# Patient Record
Sex: MALE | Race: WHITE | NOT HISPANIC OR LATINO | Employment: FULL TIME | URBAN - METROPOLITAN AREA
[De-identification: names, ages, dates, MRNs, and addresses within clinical notes are randomized per-mention and may not be internally consistent; named-entity substitution may affect disease eponyms.]

---

## 2024-07-09 ENCOUNTER — APPOINTMENT (OUTPATIENT)
Dept: PRIMARY CARE | Facility: CLINIC | Age: 59
End: 2024-07-09
Payer: MEDICARE

## 2024-07-17 ENCOUNTER — LAB (OUTPATIENT)
Dept: LAB | Facility: LAB | Age: 59
End: 2024-07-17
Payer: MEDICARE

## 2024-07-17 ENCOUNTER — APPOINTMENT (OUTPATIENT)
Dept: PRIMARY CARE | Facility: CLINIC | Age: 59
End: 2024-07-17
Payer: MEDICARE

## 2024-07-17 VITALS
WEIGHT: 259 LBS | RESPIRATION RATE: 16 BRPM | BODY MASS INDEX: 34.33 KG/M2 | HEART RATE: 80 BPM | HEIGHT: 73 IN | TEMPERATURE: 97 F | DIASTOLIC BLOOD PRESSURE: 68 MMHG | OXYGEN SATURATION: 96 % | SYSTOLIC BLOOD PRESSURE: 130 MMHG

## 2024-07-17 DIAGNOSIS — Z12.5 PROSTATE CANCER SCREENING: ICD-10-CM

## 2024-07-17 DIAGNOSIS — I10 HYPERTENSION, UNSPECIFIED TYPE: ICD-10-CM

## 2024-07-17 DIAGNOSIS — R06.83 SNORING: ICD-10-CM

## 2024-07-17 DIAGNOSIS — Z13.220 LIPID SCREENING: ICD-10-CM

## 2024-07-17 DIAGNOSIS — R73.9 HYPERGLYCEMIA: ICD-10-CM

## 2024-07-17 DIAGNOSIS — R53.82 CHRONIC FATIGUE: Primary | ICD-10-CM

## 2024-07-17 DIAGNOSIS — E66.09 CLASS 1 OBESITY DUE TO EXCESS CALORIES WITHOUT SERIOUS COMORBIDITY WITH BODY MASS INDEX (BMI) OF 34.0 TO 34.9 IN ADULT: ICD-10-CM

## 2024-07-17 DIAGNOSIS — Z76.89 ENCOUNTER TO ESTABLISH CARE WITH NEW DOCTOR: ICD-10-CM

## 2024-07-17 DIAGNOSIS — Z12.11 COLON CANCER SCREENING: ICD-10-CM

## 2024-07-17 PROBLEM — E66.811 CLASS 1 OBESITY DUE TO EXCESS CALORIES WITHOUT SERIOUS COMORBIDITY WITH BODY MASS INDEX (BMI) OF 34.0 TO 34.9 IN ADULT: Status: ACTIVE | Noted: 2024-07-17

## 2024-07-17 LAB
ALBUMIN SERPL BCP-MCNC: 4.8 G/DL (ref 3.4–5)
ALP SERPL-CCNC: 66 U/L (ref 33–120)
ALT SERPL W P-5'-P-CCNC: 19 U/L (ref 10–52)
ANION GAP SERPL CALC-SCNC: 15 MMOL/L (ref 10–20)
AST SERPL W P-5'-P-CCNC: 21 U/L (ref 9–39)
BASOPHILS # BLD AUTO: 0.06 X10*3/UL (ref 0–0.1)
BASOPHILS NFR BLD AUTO: 0.9 %
BILIRUB SERPL-MCNC: 0.9 MG/DL (ref 0–1.2)
BUN SERPL-MCNC: 25 MG/DL (ref 6–23)
CALCIUM SERPL-MCNC: 9.8 MG/DL (ref 8.6–10.3)
CHLORIDE SERPL-SCNC: 103 MMOL/L (ref 98–107)
CHOLEST SERPL-MCNC: 235 MG/DL (ref 0–199)
CHOLESTEROL/HDL RATIO: 5.6
CO2 SERPL-SCNC: 28 MMOL/L (ref 21–32)
CREAT SERPL-MCNC: 1.18 MG/DL (ref 0.5–1.3)
EGFRCR SERPLBLD CKD-EPI 2021: 71 ML/MIN/1.73M*2
EOSINOPHIL # BLD AUTO: 0.13 X10*3/UL (ref 0–0.7)
EOSINOPHIL NFR BLD AUTO: 1.9 %
ERYTHROCYTE [DISTWIDTH] IN BLOOD BY AUTOMATED COUNT: 12.8 % (ref 11.5–14.5)
GLUCOSE SERPL-MCNC: 82 MG/DL (ref 74–99)
HCT VFR BLD AUTO: 51.3 % (ref 41–52)
HDLC SERPL-MCNC: 42.2 MG/DL
HGB BLD-MCNC: 17.2 G/DL (ref 13.5–17.5)
IMM GRANULOCYTES # BLD AUTO: 0.01 X10*3/UL (ref 0–0.7)
IMM GRANULOCYTES NFR BLD AUTO: 0.1 % (ref 0–0.9)
LDLC SERPL CALC-MCNC: 141 MG/DL
LYMPHOCYTES # BLD AUTO: 2.36 X10*3/UL (ref 1.2–4.8)
LYMPHOCYTES NFR BLD AUTO: 33.7 %
MCH RBC QN AUTO: 29 PG (ref 26–34)
MCHC RBC AUTO-ENTMCNC: 33.5 G/DL (ref 32–36)
MCV RBC AUTO: 86 FL (ref 80–100)
MONOCYTES # BLD AUTO: 0.64 X10*3/UL (ref 0.1–1)
MONOCYTES NFR BLD AUTO: 9.1 %
NEUTROPHILS # BLD AUTO: 3.81 X10*3/UL (ref 1.2–7.7)
NEUTROPHILS NFR BLD AUTO: 54.3 %
NON HDL CHOLESTEROL: 193 MG/DL (ref 0–149)
NRBC BLD-RTO: 0 /100 WBCS (ref 0–0)
PLATELET # BLD AUTO: 246 X10*3/UL (ref 150–450)
POTASSIUM SERPL-SCNC: 4.7 MMOL/L (ref 3.5–5.3)
PROT SERPL-MCNC: 7.5 G/DL (ref 6.4–8.2)
PSA SERPL-MCNC: 8.45 NG/ML
RBC # BLD AUTO: 5.94 X10*6/UL (ref 4.5–5.9)
SODIUM SERPL-SCNC: 141 MMOL/L (ref 136–145)
TRIGL SERPL-MCNC: 257 MG/DL (ref 0–149)
VLDL: 51 MG/DL (ref 0–40)
WBC # BLD AUTO: 7 X10*3/UL (ref 4.4–11.3)

## 2024-07-17 PROCEDURE — 80053 COMPREHEN METABOLIC PANEL: CPT

## 2024-07-17 PROCEDURE — 36415 COLL VENOUS BLD VENIPUNCTURE: CPT

## 2024-07-17 PROCEDURE — 85025 COMPLETE CBC W/AUTO DIFF WBC: CPT

## 2024-07-17 PROCEDURE — 3008F BODY MASS INDEX DOCD: CPT | Performed by: FAMILY MEDICINE

## 2024-07-17 PROCEDURE — 99203 OFFICE O/P NEW LOW 30 MIN: CPT | Performed by: FAMILY MEDICINE

## 2024-07-17 PROCEDURE — 3075F SYST BP GE 130 - 139MM HG: CPT | Performed by: FAMILY MEDICINE

## 2024-07-17 PROCEDURE — 80061 LIPID PANEL: CPT

## 2024-07-17 PROCEDURE — 84153 ASSAY OF PSA TOTAL: CPT

## 2024-07-17 PROCEDURE — 3078F DIAST BP <80 MM HG: CPT | Performed by: FAMILY MEDICINE

## 2024-07-17 PROCEDURE — 83036 HEMOGLOBIN GLYCOSYLATED A1C: CPT

## 2024-07-17 RX ORDER — LOSARTAN POTASSIUM 100 MG/1
100 TABLET ORAL DAILY
COMMUNITY

## 2024-07-17 ASSESSMENT — PATIENT HEALTH QUESTIONNAIRE - PHQ9
2. FEELING DOWN, DEPRESSED OR HOPELESS: NOT AT ALL
SUM OF ALL RESPONSES TO PHQ9 QUESTIONS 1 AND 2: 0
1. LITTLE INTEREST OR PLEASURE IN DOING THINGS: NOT AT ALL

## 2024-07-17 NOTE — LETTER
August 23, 2024     Wyattroberto carlos Zepedason  [address]  [facility] [address]      Dear Mr. Malave:    Below are the results from your recent visit:    Resulted Orders   Cologuard® colon cancer screening   Result Value Ref Range    NONINV COLON CA DNA+OCC BLD SCRN STL QL Negative Negative      Comment:        NEGATIVE TEST RESULT. A negative Cologuard result indicates a low likelihood that a colorectal cancer (CRC) or advanced adenoma (adenomatous polyps with more advanced pre-malignant features)  is present. The chance that a person with a negative Cologuard test has a colorectal cancer is less than 1 in 1500 (negative predictive value >99.9%) or has an  advanced adenoma is less than  5.3% (negative predictive value 94.7%). These data are based on a prospective cross-sectional study of 10,000 individuals at average risk for colorectal cancer who were screened with both Cologuard and colonoscopy. (Bob MURDOCK et al, N Engl J Med 2014;370(14):9019-9360) The normal value (reference range) for this assay is negative.    COLOGUARD RE-SCREENING RECOMMENDATION: Periodic colorectal cancer screening is an important part of preventive healthcare for asymptomatic individuals at average risk for colorectal cancer.  Following a negative Cologuard result, the American Cancer Society and U.S.  Multi-Society Task Force screening guidelines recommend a Cologuard re-screening interval of 3 years.   References: American Cancer Society Guideline for Colorectal Cancer Screening: https://www.cancer.org/cancer/colon-rectal-cancer/gtayebgzd-uxnwazelo-lxabnus/acs-recommendations.html.; Bladimir ENNIS, Marlen BERRY, Qasim STOKESK, Colorectal Cancer Screening: Recommendations for Physicians and Patients from the U.S. Multi-Society Task Force on Colorectal Cancer Screening , Am J Gastroenterology 2017; 112:2524-3778.    TEST DESCRIPTION: Composite algorithmic analysis of stool DNA-biomarkers with hemoglobin immunoassay.   Quantitative values of  individual biomarkers are not reportable and are not associated with individual biomarker result reference ranges. Cologuard is intended for colorectal cancer screening of adults of either sex, 45 years or older, who are at average-risk for colorectal cancer (CRC). Cologuard has been approved for use by the U.S. FDA. The performance of Cologuard was  established in a cross sectional study of average-risk adults aged 50-84. Cologuard performance in patients ages 45 to 49 years was estimated by sub-group analysis of near-age groups. Colonoscopies performed for a positive result may find as the most clinically significant lesion: colorectal cancer [4.0%], advanced adenoma (including sessile serrated polyps greater than or equal to 1cm diameter) [20%] or non- advanced adenoma [31%]; or no colorectal neoplasia [45%]. These estimates are derived from a prospective cross-sectional screening study of 10,000 individuals at average risk for colorectal cancer who were screened with both Cologuard and colonoscopy. (Bob STEWART. et al, N Engl J Med 2014;370(14):5275-3055.) Cologuard may produce a false negative or false positive result (no colorectal cancer or precancerous polyp present at colonoscopy follow up). A negative Cologuard test result does not guarantee the absence of CRC or advanced adenoma (pre-cancer). The current Cologuard  screening interval is every 3 years. (American Cancer Society and U.S. Multi-Society Task Force). Cologuard performance data in a 10,000 patient pivotal study using colonoscopy as the reference method can be accessed at the following location: www.[employer].Spotistic/results. Additional description of the Cologuard test process, warnings and precautions can be found at www.cologuard.com.       Cologuard negative.  Repeat in 3 years.    If you have any questions or concerns, please don't hesitate to call.         Sincerely,        Mateo Tong MD

## 2024-07-17 NOTE — PROGRESS NOTES
Subjective   Patient ID: Wyatt Malave is a 58 y.o. male who presents for Establish Care, Hypertension, and sleeping issues .  HPI  Patient presents today as a new patient  His last PCP was Dr Tong  Last seen this PCP 8 year  Choose to come here because was pt here long time ago    Being seen today for  Est care, HTN    Denies chest pain, swelling, headaches, lightheadedness or dizziness.   Some SOB  Tries Eats a generally healthy diet, staying active .   Does check BP at home.   Currently taking losartan     Pt is having issues with sleep on going for long time  Pt states that he stop breathing while he is sleeping    Pt is getting up 3 to 4 x a night to urine       No other questions and or concerns   Review of Systems  Constitutional:  no chills, no fever and no night sweats.  Eyes: no blurred vision and no eyesight problems.  ENT: no hearing loss, no nasal congestion, no hoarseness and no sore throat.  Neck: no mass (es) and no swelling.  Cardiovascular: no chest pain, no intermittent leg claudication, no lower extremity edema, no palpitation and no syncope.  Respiratory: no cough, no shortness of breath during exertion, no shortness of breath at rest and no wheezing.  Gastrointestinal: no abdominal pain, no blood in stools, no constipation, no diarrhea, no melena, no nausea, no rectal pain and no vomiting.  Genitourinary: no dysuria, no change in urinary frequency, no urinary hesitancy and no feelings of urinary urgency.  Musculoskeletal: no arthralgias, no back pain and no myalgias.  Integumentary: no new skin lesions and no rashes.  Neurological: no difficulty walking, no headache, no limb weakness, no numbness and no tingling.  Psychiatric/Behavioral: no anxiety, no depression, no anhedonia and no substance use disorders.  Endocrine: no recent weight gain and no recent weight loss.  Hematologic/Lymphatic: no tendency for easy bruising and no swollen glands    Objective   Physical Exam  Patient in with  "complaints of sleep issues snoring supposedly was tested and had sleep apnea test was done a number of years ago but he never did the titration or got the machinery.  His wife is also concerned that he may have diabetes has not had a physical exam or blood work no blood in a while.  He has known complaint of chest pain or shortness of breath at this point.  Well-developed well-nourished obese male in no acute distress physical exam today's office visit constitutional alert and oriented x3.    Head is atraumatic HEENT is within normal limits.    Neck supple no masses full range of motion.    Thyroid is normal in size no thyromegaly there is no carotid bruits.    Pulmonary exam shows clear to auscultation no respiratory distress.    Cardiovascular shows no murmur rub or gallop.  Regular rate and rhythm.    Abdominal exam soft nontender no hepatosplenomegaly or masses normal bowel sounds no rebound no guarding.    Musculoskeletal exam no joint pain no muscle pain full range of motion.    Psych exam normal mood and affect.    Dermatologic exam no skin lesions no rash no blemishes.    Neuro exam is no focal deficits.  Normal exam.    Extremities no edema normal pulses normal capillary refill.    /68 (BP Location: Left arm, Patient Position: Sitting, BP Cuff Size: Large adult)   Pulse 80   Temp 36.1 °C (97 °F) (Temporal)   Resp 16   Ht 1.854 m (6' 1\")   Wt 117 kg (259 lb)   SpO2 96%   BMI 34.17 kg/m²     Lab Results   Component Value Date    WBC 7.0 07/17/2024    HGB 17.2 07/17/2024    HCT 51.3 07/17/2024    MCV 86 07/17/2024     07/17/2024       Assessment/Plan plan is to get blood drawn also refer him for home sleep study follow-up when we have all the results.  Problem List Items Addressed This Visit          Cardiac and Vasculature    HTN (hypertension)    Relevant Orders    Comprehensive Metabolic Panel (Completed)    CBC and Auto Differential (Completed)       Endocrine/Metabolic    BMI " 34.0-34.9,adult    Class 1 obesity due to excess calories without serious comorbidity with body mass index (BMI) of 34.0 to 34.9 in adult       Health Encounters    Encounter to establish care with new doctor     Other Visit Diagnoses       Chronic fatigue    -  Primary    Relevant Orders    Home sleep apnea test (HSAT)    Lipid screening        Relevant Orders    Lipid Panel (Completed)    Prostate cancer screening        Relevant Orders    Prostate Specific Antigen, Screen (Completed)    Colon cancer screening        Relevant Orders    Cologuard® colon cancer screening    Snoring        Relevant Orders    Home sleep apnea test (HSAT)    Hyperglycemia        Relevant Orders    Hemoglobin A1C (Completed)

## 2024-07-18 LAB
EST. AVERAGE GLUCOSE BLD GHB EST-MCNC: 120 MG/DL
HBA1C MFR BLD: 5.8 %

## 2024-08-23 ENCOUNTER — TELEPHONE (OUTPATIENT)
Dept: PRIMARY CARE | Facility: CLINIC | Age: 59
End: 2024-08-23
Payer: MEDICARE

## 2024-08-23 LAB — NONINV COLON CA DNA+OCC BLD SCRN STL QL: NEGATIVE

## 2024-08-23 NOTE — TELEPHONE ENCOUNTER
----- Message from Mateo Tong sent at 8/23/2024  8:29 AM EDT -----  Cologuard negative.  Repeat in 3 years

## 2025-03-22 ENCOUNTER — HOSPITAL ENCOUNTER (INPATIENT)
Facility: HOSPITAL | Age: 60
DRG: 309 | End: 2025-03-22
Attending: STUDENT IN AN ORGANIZED HEALTH CARE EDUCATION/TRAINING PROGRAM | Admitting: STUDENT IN AN ORGANIZED HEALTH CARE EDUCATION/TRAINING PROGRAM
Payer: MEDICARE

## 2025-03-22 ENCOUNTER — APPOINTMENT (OUTPATIENT)
Dept: CARDIOLOGY | Facility: HOSPITAL | Age: 60
DRG: 309 | End: 2025-03-22
Payer: MEDICARE

## 2025-03-22 ENCOUNTER — APPOINTMENT (OUTPATIENT)
Dept: RADIOLOGY | Facility: HOSPITAL | Age: 60
DRG: 309 | End: 2025-03-22
Payer: MEDICARE

## 2025-03-22 DIAGNOSIS — I48.91 ATRIAL FIBRILLATION WITH RVR (MULTI): Primary | ICD-10-CM

## 2025-03-22 DIAGNOSIS — R07.9 CHEST PAIN, UNSPECIFIED TYPE: ICD-10-CM

## 2025-03-22 DIAGNOSIS — M79.89: ICD-10-CM

## 2025-03-22 DIAGNOSIS — I25.10 CORONARY ARTERY CALCIFICATION SEEN ON CAT SCAN: ICD-10-CM

## 2025-03-22 DIAGNOSIS — N18.9: ICD-10-CM

## 2025-03-22 DIAGNOSIS — M25.512 LEFT SHOULDER PAIN, UNSPECIFIED CHRONICITY: ICD-10-CM

## 2025-03-22 DIAGNOSIS — I48.19 OTHER PERSISTENT ATRIAL FIBRILLATION: ICD-10-CM

## 2025-03-22 LAB
ALBUMIN SERPL BCP-MCNC: 4.4 G/DL (ref 3.4–5)
ALP SERPL-CCNC: 43 U/L (ref 33–136)
ALT SERPL W P-5'-P-CCNC: 23 U/L (ref 10–52)
ANION GAP SERPL CALC-SCNC: 18 MMOL/L (ref 10–20)
AST SERPL W P-5'-P-CCNC: 24 U/L (ref 9–39)
ATRIAL RATE: 174 BPM
BASOPHILS # BLD AUTO: 0.06 X10*3/UL (ref 0–0.1)
BASOPHILS NFR BLD AUTO: 0.5 %
BILIRUB SERPL-MCNC: 1.4 MG/DL (ref 0–1.2)
BNP SERPL-MCNC: 56 PG/ML (ref 0–99)
BUN SERPL-MCNC: 26 MG/DL (ref 6–23)
CALCIUM SERPL-MCNC: 9.7 MG/DL (ref 8.6–10.3)
CARDIAC TROPONIN I PNL SERPL HS: 3 NG/L (ref 0–20)
CARDIAC TROPONIN I PNL SERPL HS: <3 NG/L (ref 0–20)
CHLORIDE SERPL-SCNC: 102 MMOL/L (ref 98–107)
CO2 SERPL-SCNC: 21 MMOL/L (ref 21–32)
CREAT SERPL-MCNC: 1.48 MG/DL (ref 0.5–1.3)
D DIMER PPP FEU-MCNC: 273 NG/ML FEU
EGFRCR SERPLBLD CKD-EPI 2021: 54 ML/MIN/1.73M*2
EOSINOPHIL # BLD AUTO: 0.11 X10*3/UL (ref 0–0.7)
EOSINOPHIL NFR BLD AUTO: 0.8 %
ERYTHROCYTE [DISTWIDTH] IN BLOOD BY AUTOMATED COUNT: 12.9 % (ref 11.5–14.5)
EST. AVERAGE GLUCOSE BLD GHB EST-MCNC: 108 MG/DL
GLUCOSE SERPL-MCNC: 98 MG/DL (ref 74–99)
HBA1C MFR BLD: 5.4 %
HCT VFR BLD AUTO: 54.9 % (ref 41–52)
HGB BLD-MCNC: 18.7 G/DL (ref 13.5–17.5)
IMM GRANULOCYTES # BLD AUTO: 0.04 X10*3/UL (ref 0–0.7)
IMM GRANULOCYTES NFR BLD AUTO: 0.3 % (ref 0–0.9)
LYMPHOCYTES # BLD AUTO: 1.95 X10*3/UL (ref 1.2–4.8)
LYMPHOCYTES NFR BLD AUTO: 14.8 %
MAGNESIUM SERPL-MCNC: 2.05 MG/DL (ref 1.6–2.4)
MCH RBC QN AUTO: 29.2 PG (ref 26–34)
MCHC RBC AUTO-ENTMCNC: 34.1 G/DL (ref 32–36)
MCV RBC AUTO: 86 FL (ref 80–100)
MONOCYTES # BLD AUTO: 1.32 X10*3/UL (ref 0.1–1)
MONOCYTES NFR BLD AUTO: 10 %
NEUTROPHILS # BLD AUTO: 9.74 X10*3/UL (ref 1.2–7.7)
NEUTROPHILS NFR BLD AUTO: 73.6 %
NRBC BLD-RTO: 0 /100 WBCS (ref 0–0)
PLATELET # BLD AUTO: 288 X10*3/UL (ref 150–450)
POTASSIUM SERPL-SCNC: 3.9 MMOL/L (ref 3.5–5.3)
PROT SERPL-MCNC: 7 G/DL (ref 6.4–8.2)
Q ONSET: 218 MS
QRS COUNT: 26 BEATS
QRS DURATION: 80 MS
QT INTERVAL: 292 MS
QTC CALCULATION(BAZETT): 469 MS
QTC FREDERICIA: 400 MS
R AXIS: -66 DEGREES
RBC # BLD AUTO: 6.4 X10*6/UL (ref 4.5–5.9)
SODIUM SERPL-SCNC: 137 MMOL/L (ref 136–145)
T AXIS: 63 DEGREES
T OFFSET: 364 MS
TSH SERPL-ACNC: 1.33 MIU/L (ref 0.44–3.98)
VENTRICULAR RATE: 155 BPM
WBC # BLD AUTO: 13.2 X10*3/UL (ref 4.4–11.3)

## 2025-03-22 PROCEDURE — 2500000001 HC RX 250 WO HCPCS SELF ADMINISTERED DRUGS (ALT 637 FOR MEDICARE OP)

## 2025-03-22 PROCEDURE — 96376 TX/PRO/DX INJ SAME DRUG ADON: CPT

## 2025-03-22 PROCEDURE — 36415 COLL VENOUS BLD VENIPUNCTURE: CPT | Performed by: STUDENT IN AN ORGANIZED HEALTH CARE EDUCATION/TRAINING PROGRAM

## 2025-03-22 PROCEDURE — 83036 HEMOGLOBIN GLYCOSYLATED A1C: CPT | Mod: STJLAB

## 2025-03-22 PROCEDURE — 99223 1ST HOSP IP/OBS HIGH 75: CPT | Performed by: STUDENT IN AN ORGANIZED HEALTH CARE EDUCATION/TRAINING PROGRAM

## 2025-03-22 PROCEDURE — 85025 COMPLETE CBC W/AUTO DIFF WBC: CPT | Performed by: STUDENT IN AN ORGANIZED HEALTH CARE EDUCATION/TRAINING PROGRAM

## 2025-03-22 PROCEDURE — 71275 CT ANGIOGRAPHY CHEST: CPT

## 2025-03-22 PROCEDURE — 93005 ELECTROCARDIOGRAM TRACING: CPT

## 2025-03-22 PROCEDURE — 2500000005 HC RX 250 GENERAL PHARMACY W/O HCPCS

## 2025-03-22 PROCEDURE — 85379 FIBRIN DEGRADATION QUANT: CPT | Performed by: STUDENT IN AN ORGANIZED HEALTH CARE EDUCATION/TRAINING PROGRAM

## 2025-03-22 PROCEDURE — 73030 X-RAY EXAM OF SHOULDER: CPT | Mod: LEFT SIDE | Performed by: RADIOLOGY

## 2025-03-22 PROCEDURE — 99285 EMERGENCY DEPT VISIT HI MDM: CPT | Performed by: STUDENT IN AN ORGANIZED HEALTH CARE EDUCATION/TRAINING PROGRAM

## 2025-03-22 PROCEDURE — 96365 THER/PROPH/DIAG IV INF INIT: CPT | Mod: 59

## 2025-03-22 PROCEDURE — 2500000004 HC RX 250 GENERAL PHARMACY W/ HCPCS (ALT 636 FOR OP/ED): Performed by: STUDENT IN AN ORGANIZED HEALTH CARE EDUCATION/TRAINING PROGRAM

## 2025-03-22 PROCEDURE — 71275 CT ANGIOGRAPHY CHEST: CPT | Mod: FOREIGN READ | Performed by: RADIOLOGY

## 2025-03-22 PROCEDURE — 73030 X-RAY EXAM OF SHOULDER: CPT | Mod: LT

## 2025-03-22 PROCEDURE — 71045 X-RAY EXAM CHEST 1 VIEW: CPT | Mod: FOREIGN READ | Performed by: RADIOLOGY

## 2025-03-22 PROCEDURE — 96361 HYDRATE IV INFUSION ADD-ON: CPT

## 2025-03-22 PROCEDURE — 80053 COMPREHEN METABOLIC PANEL: CPT | Performed by: STUDENT IN AN ORGANIZED HEALTH CARE EDUCATION/TRAINING PROGRAM

## 2025-03-22 PROCEDURE — 84443 ASSAY THYROID STIM HORMONE: CPT

## 2025-03-22 PROCEDURE — 96374 THER/PROPH/DIAG INJ IV PUSH: CPT | Mod: 59

## 2025-03-22 PROCEDURE — 2060000001 HC INTERMEDIATE ICU ROOM DAILY

## 2025-03-22 PROCEDURE — 2500000004 HC RX 250 GENERAL PHARMACY W/ HCPCS (ALT 636 FOR OP/ED): Performed by: EMERGENCY MEDICINE

## 2025-03-22 PROCEDURE — 83880 ASSAY OF NATRIURETIC PEPTIDE: CPT | Performed by: STUDENT IN AN ORGANIZED HEALTH CARE EDUCATION/TRAINING PROGRAM

## 2025-03-22 PROCEDURE — 2500000004 HC RX 250 GENERAL PHARMACY W/ HCPCS (ALT 636 FOR OP/ED)

## 2025-03-22 PROCEDURE — 96372 THER/PROPH/DIAG INJ SC/IM: CPT | Performed by: EMERGENCY MEDICINE

## 2025-03-22 PROCEDURE — 96375 TX/PRO/DX INJ NEW DRUG ADDON: CPT

## 2025-03-22 PROCEDURE — 99285 EMERGENCY DEPT VISIT HI MDM: CPT | Mod: 25 | Performed by: STUDENT IN AN ORGANIZED HEALTH CARE EDUCATION/TRAINING PROGRAM

## 2025-03-22 PROCEDURE — 71045 X-RAY EXAM CHEST 1 VIEW: CPT

## 2025-03-22 PROCEDURE — 84484 ASSAY OF TROPONIN QUANT: CPT | Performed by: STUDENT IN AN ORGANIZED HEALTH CARE EDUCATION/TRAINING PROGRAM

## 2025-03-22 PROCEDURE — 96366 THER/PROPH/DIAG IV INF ADDON: CPT

## 2025-03-22 PROCEDURE — 2550000001 HC RX 255 CONTRASTS: Performed by: STUDENT IN AN ORGANIZED HEALTH CARE EDUCATION/TRAINING PROGRAM

## 2025-03-22 PROCEDURE — 83735 ASSAY OF MAGNESIUM: CPT | Performed by: STUDENT IN AN ORGANIZED HEALTH CARE EDUCATION/TRAINING PROGRAM

## 2025-03-22 RX ORDER — LIDOCAINE 560 MG/1
1 PATCH PERCUTANEOUS; TOPICAL; TRANSDERMAL DAILY
Status: DISCONTINUED | OUTPATIENT
Start: 2025-03-22 | End: 2025-03-24 | Stop reason: HOSPADM

## 2025-03-22 RX ORDER — SILDENAFIL 100 MG/1
100 TABLET, FILM COATED ORAL DAILY PRN
COMMUNITY

## 2025-03-22 RX ORDER — METOPROLOL TARTRATE 1 MG/ML
INJECTION, SOLUTION INTRAVENOUS
Status: COMPLETED
Start: 2025-03-22 | End: 2025-03-22

## 2025-03-22 RX ORDER — LORAZEPAM 2 MG/ML
0.5 INJECTION INTRAMUSCULAR EVERY 2 HOUR PRN
Status: DISCONTINUED | OUTPATIENT
Start: 2025-03-22 | End: 2025-03-22

## 2025-03-22 RX ORDER — METOPROLOL TARTRATE 1 MG/ML
5 INJECTION, SOLUTION INTRAVENOUS
Status: DISCONTINUED | OUTPATIENT
Start: 2025-03-22 | End: 2025-03-24 | Stop reason: HOSPADM

## 2025-03-22 RX ORDER — LOSARTAN POTASSIUM AND HYDROCHLOROTHIAZIDE 12.5; 5 MG/1; MG/1
1 TABLET ORAL DAILY
COMMUNITY

## 2025-03-22 RX ORDER — ENOXAPARIN SODIUM 150 MG/ML
1 INJECTION SUBCUTANEOUS 2 TIMES DAILY
Status: DISCONTINUED | OUTPATIENT
Start: 2025-03-23 | End: 2025-03-22

## 2025-03-22 RX ORDER — ACETAMINOPHEN 325 MG/1
975 TABLET ORAL EVERY 6 HOURS PRN
Status: DISCONTINUED | OUTPATIENT
Start: 2025-03-22 | End: 2025-03-24

## 2025-03-22 RX ORDER — THIAMINE HYDROCHLORIDE 100 MG/ML
100 INJECTION, SOLUTION INTRAMUSCULAR; INTRAVENOUS DAILY
Status: COMPLETED | OUTPATIENT
Start: 2025-03-22 | End: 2025-03-24

## 2025-03-22 RX ORDER — ENOXAPARIN SODIUM 100 MG/ML
40 INJECTION SUBCUTANEOUS EVERY 24 HOURS
Status: DISCONTINUED | OUTPATIENT
Start: 2025-03-23 | End: 2025-03-22

## 2025-03-22 RX ORDER — FOLIC ACID 1 MG/1
1 TABLET ORAL DAILY
Status: DISCONTINUED | OUTPATIENT
Start: 2025-03-22 | End: 2025-03-24

## 2025-03-22 RX ORDER — ENOXAPARIN SODIUM 150 MG/ML
1 INJECTION SUBCUTANEOUS ONCE
Status: COMPLETED | OUTPATIENT
Start: 2025-03-22 | End: 2025-03-22

## 2025-03-22 RX ORDER — MAGNESIUM SULFATE HEPTAHYDRATE 40 MG/ML
2 INJECTION, SOLUTION INTRAVENOUS ONCE
Status: COMPLETED | OUTPATIENT
Start: 2025-03-22 | End: 2025-03-22

## 2025-03-22 RX ORDER — METOPROLOL TARTRATE 25 MG/1
25 TABLET, FILM COATED ORAL 2 TIMES DAILY
Status: DISCONTINUED | OUTPATIENT
Start: 2025-03-22 | End: 2025-03-23

## 2025-03-22 RX ORDER — ENOXAPARIN SODIUM 150 MG/ML
1 INJECTION SUBCUTANEOUS EVERY 12 HOURS
Status: DISCONTINUED | OUTPATIENT
Start: 2025-03-23 | End: 2025-03-23

## 2025-03-22 RX ORDER — LORAZEPAM 2 MG/ML
1 INJECTION INTRAMUSCULAR EVERY 2 HOUR PRN
Status: DISCONTINUED | OUTPATIENT
Start: 2025-03-22 | End: 2025-03-22

## 2025-03-22 RX ORDER — FENTANYL CITRATE 50 UG/ML
25 INJECTION, SOLUTION INTRAMUSCULAR; INTRAVENOUS ONCE
Status: COMPLETED | OUTPATIENT
Start: 2025-03-22 | End: 2025-03-22

## 2025-03-22 RX ORDER — LANOLIN ALCOHOL/MO/W.PET/CERES
100 CREAM (GRAM) TOPICAL DAILY
Status: DISCONTINUED | OUTPATIENT
Start: 2025-03-25 | End: 2025-03-24

## 2025-03-22 RX ORDER — METOPROLOL TARTRATE 1 MG/ML
5 INJECTION, SOLUTION INTRAVENOUS
Status: DISCONTINUED | OUTPATIENT
Start: 2025-03-22 | End: 2025-03-22

## 2025-03-22 RX ORDER — OXYCODONE HYDROCHLORIDE 5 MG/1
5 TABLET ORAL EVERY 6 HOURS PRN
Status: DISCONTINUED | OUTPATIENT
Start: 2025-03-22 | End: 2025-03-24 | Stop reason: HOSPADM

## 2025-03-22 RX ORDER — LORAZEPAM 2 MG/ML
2 INJECTION INTRAMUSCULAR EVERY 2 HOUR PRN
Status: DISCONTINUED | OUTPATIENT
Start: 2025-03-22 | End: 2025-03-22

## 2025-03-22 RX ADMIN — IOHEXOL 90 ML: 350 INJECTION, SOLUTION INTRAVENOUS at 15:43

## 2025-03-22 RX ADMIN — METOPROLOL TARTRATE 5 MG: 5 INJECTION INTRAVENOUS at 16:45

## 2025-03-22 RX ADMIN — METOPROLOL TARTRATE 5 MG: 5 INJECTION INTRAVENOUS at 13:10

## 2025-03-22 RX ADMIN — THIAMINE HYDROCHLORIDE 100 MG: 100 INJECTION, SOLUTION INTRAMUSCULAR; INTRAVENOUS at 12:57

## 2025-03-22 RX ADMIN — FENTANYL CITRATE 25 MCG: 50 INJECTION, SOLUTION INTRAMUSCULAR; INTRAVENOUS at 14:23

## 2025-03-22 RX ADMIN — FOLIC ACID 1 MG: 1 TABLET ORAL at 18:11

## 2025-03-22 RX ADMIN — ENOXAPARIN SODIUM 120 MG: 120 INJECTION SUBCUTANEOUS at 16:57

## 2025-03-22 RX ADMIN — Medication 1 CAPSULE: at 18:20

## 2025-03-22 RX ADMIN — METOPROLOL TARTRATE 25 MG: 25 TABLET, FILM COATED ORAL at 18:11

## 2025-03-22 RX ADMIN — LORAZEPAM 2 MG: 2 INJECTION INTRAMUSCULAR; INTRAVENOUS at 12:55

## 2025-03-22 RX ADMIN — MAGNESIUM SULFATE HEPTAHYDRATE 2 G: 40 INJECTION, SOLUTION INTRAVENOUS at 12:59

## 2025-03-22 RX ADMIN — LIDOCAINE 1 PATCH: 4 PATCH TOPICAL at 18:59

## 2025-03-22 RX ADMIN — SODIUM CHLORIDE 1000 ML: 9 INJECTION, SOLUTION INTRAVENOUS at 12:54

## 2025-03-22 SDOH — ECONOMIC STABILITY: FOOD INSECURITY: WITHIN THE PAST 12 MONTHS, YOU WORRIED THAT YOUR FOOD WOULD RUN OUT BEFORE YOU GOT THE MONEY TO BUY MORE.: NEVER TRUE

## 2025-03-22 SDOH — SOCIAL STABILITY: SOCIAL INSECURITY: ABUSE: ADULT

## 2025-03-22 SDOH — ECONOMIC STABILITY: INCOME INSECURITY: IN THE PAST 12 MONTHS HAS THE ELECTRIC, GAS, OIL, OR WATER COMPANY THREATENED TO SHUT OFF SERVICES IN YOUR HOME?: NO

## 2025-03-22 SDOH — SOCIAL STABILITY: SOCIAL INSECURITY: HAVE YOU HAD ANY THOUGHTS OF HARMING ANYONE ELSE?: NO

## 2025-03-22 SDOH — SOCIAL STABILITY: SOCIAL INSECURITY
WITHIN THE LAST YEAR, HAVE YOU BEEN RAPED OR FORCED TO HAVE ANY KIND OF SEXUAL ACTIVITY BY YOUR PARTNER OR EX-PARTNER?: NO

## 2025-03-22 SDOH — ECONOMIC STABILITY: FOOD INSECURITY: WITHIN THE PAST 12 MONTHS, THE FOOD YOU BOUGHT JUST DIDN'T LAST AND YOU DIDN'T HAVE MONEY TO GET MORE.: NEVER TRUE

## 2025-03-22 SDOH — SOCIAL STABILITY: SOCIAL INSECURITY: WITHIN THE LAST YEAR, HAVE YOU BEEN HUMILIATED OR EMOTIONALLY ABUSED IN OTHER WAYS BY YOUR PARTNER OR EX-PARTNER?: NO

## 2025-03-22 SDOH — SOCIAL STABILITY: SOCIAL INSECURITY: WITHIN THE LAST YEAR, HAVE YOU BEEN AFRAID OF YOUR PARTNER OR EX-PARTNER?: NO

## 2025-03-22 SDOH — SOCIAL STABILITY: SOCIAL INSECURITY: DO YOU FEEL ANYONE HAS EXPLOITED OR TAKEN ADVANTAGE OF YOU FINANCIALLY OR OF YOUR PERSONAL PROPERTY?: NO

## 2025-03-22 SDOH — SOCIAL STABILITY: SOCIAL INSECURITY: HAVE YOU HAD THOUGHTS OF HARMING ANYONE ELSE?: NO

## 2025-03-22 SDOH — SOCIAL STABILITY: SOCIAL INSECURITY
WITHIN THE LAST YEAR, HAVE YOU BEEN KICKED, HIT, SLAPPED, OR OTHERWISE PHYSICALLY HURT BY YOUR PARTNER OR EX-PARTNER?: NO

## 2025-03-22 SDOH — SOCIAL STABILITY: SOCIAL INSECURITY: DO YOU FEEL UNSAFE GOING BACK TO THE PLACE WHERE YOU ARE LIVING?: NO

## 2025-03-22 SDOH — SOCIAL STABILITY: SOCIAL INSECURITY: DOES ANYONE TRY TO KEEP YOU FROM HAVING/CONTACTING OTHER FRIENDS OR DOING THINGS OUTSIDE YOUR HOME?: NO

## 2025-03-22 SDOH — SOCIAL STABILITY: SOCIAL INSECURITY: HAS ANYONE EVER THREATENED TO HURT YOUR FAMILY OR YOUR PETS?: NO

## 2025-03-22 SDOH — SOCIAL STABILITY: SOCIAL INSECURITY: ARE YOU OR HAVE YOU BEEN THREATENED OR ABUSED PHYSICALLY, EMOTIONALLY, OR SEXUALLY BY ANYONE?: NO

## 2025-03-22 SDOH — SOCIAL STABILITY: SOCIAL INSECURITY: ARE THERE ANY APPARENT SIGNS OF INJURIES/BEHAVIORS THAT COULD BE RELATED TO ABUSE/NEGLECT?: NO

## 2025-03-22 ASSESSMENT — PAIN DESCRIPTION - LOCATION
LOCATION: CHEST
LOCATION: CHEST

## 2025-03-22 ASSESSMENT — LIFESTYLE VARIABLES
TREMOR: NOT VISIBLE, BUT CAN BE FELT FINGERTIP TO FINGERTIP
PAROXYSMAL SWEATS: 3
PAROXYSMAL SWEATS: BARELY PERCEPTIBLE SWEATING, PALMS MOIST
VISUAL DISTURBANCES: NOT PRESENT
AUDIT TOTAL SCORE: 0
AGITATION: NORMAL ACTIVITY
ORIENTATION AND CLOUDING OF SENSORIUM: ORIENTED AND CAN DO SERIAL ADDITIONS
AGITATION: NORMAL ACTIVITY
AUDITORY DISTURBANCES: NOT PRESENT
NAUSEA AND VOMITING: NO NAUSEA AND NO VOMITING
TREMOR: NOT VISIBLE, BUT CAN BE FELT FINGERTIP TO FINGERTIP
HEADACHE, FULLNESS IN HEAD: MILD
HEADACHE, FULLNESS IN HEAD: VERY MILD
VISUAL DISTURBANCES: NOT PRESENT
TOTAL SCORE: 2
AUDIT-C TOTAL SCORE: 9
AUDITORY DISTURBANCES: NOT PRESENT
BLOOD PRESSURE: 123/69
ANXIETY: 2
TREMOR: NOT VISIBLE, BUT CAN BE FELT FINGERTIP TO FINGERTIP
HEADACHE, FULLNESS IN HEAD: NOT PRESENT
NAUSEA AND VOMITING: NO NAUSEA AND NO VOMITING
AUDITORY DISTURBANCES: NOT PRESENT
AUDIT-C TOTAL SCORE: 9
HEADACHE, FULLNESS IN HEAD: NOT PRESENT
PAROXYSMAL SWEATS: BARELY PERCEPTIBLE SWEATING, PALMS MOIST
HOW OFTEN DURING THE LAST YEAR HAVE YOU FOUND THAT YOU WERE NOT ABLE TO STOP DRINKING ONCE YOU HAD STARTED: NEVER
NAUSEA AND VOMITING: MILD NAUSEA WITH NO VOMITING
HOW OFTEN DURING THE LAST YEAR HAVE YOU FAILED TO DO WHAT WAS NORMALLY EXPECTED FROM YOU BECAUSE OF DRINKING: NEVER
TOTAL SCORE: 3
PULSE: 106
TOTAL SCORE: 2
ORIENTATION AND CLOUDING OF SENSORIUM: ORIENTED AND CAN DO SERIAL ADDITIONS
AGITATION: NORMAL ACTIVITY
ORIENTATION AND CLOUDING OF SENSORIUM: ORIENTED AND CAN DO SERIAL ADDITIONS
AGITATION: NORMAL ACTIVITY
NAUSEA AND VOMITING: NO NAUSEA AND NO VOMITING
TREMOR: NOT VISIBLE, BUT CAN BE FELT FINGERTIP TO FINGERTIP
HOW OFTEN DURING THE LAST YEAR HAVE YOU HAD A FEELING OF GUILT OR REMORSE AFTER DRINKING: NEVER
AGITATION: NORMAL ACTIVITY
TOTAL SCORE: 3
AUDIT TOTAL SCORE: 9
AUDITORY DISTURBANCES: NOT PRESENT
SKIP TO QUESTIONS 9-10: 0
NAUSEA AND VOMITING: NO NAUSEA AND NO VOMITING
PAROXYSMAL SWEATS: NO SWEAT VISIBLE
AUDITORY DISTURBANCES: NOT PRESENT
ANXIETY: MILDLY ANXIOUS
ORIENTATION AND CLOUDING OF SENSORIUM: ORIENTED AND CAN DO SERIAL ADDITIONS
HOW OFTEN DURING THE LAST YEAR HAVE YOU NEEDED AN ALCOHOLIC DRINK FIRST THING IN THE MORNING TO GET YOURSELF GOING AFTER A NIGHT OF HEAVY DRINKING: NEVER
PAROXYSMAL SWEATS: NO SWEAT VISIBLE
AGITATION: SOMEWHAT MORE THAN NORMAL ACTIVITY
AUDITORY DISTURBANCES: NOT PRESENT
TREMOR: 2
ANXIETY: MILDLY ANXIOUS
AGITATION: NORMAL ACTIVITY
NAUSEA AND VOMITING: NO NAUSEA AND NO VOMITING
PAROXYSMAL SWEATS: NO SWEAT VISIBLE
HAVE YOU OR SOMEONE ELSE BEEN INJURED AS A RESULT OF YOUR DRINKING: NO
TOTAL SCORE: 3
HOW MANY STANDARD DRINKS CONTAINING ALCOHOL DO YOU HAVE ON A TYPICAL DAY: 5 OR 6
TOTAL SCORE: 2
HEADACHE, FULLNESS IN HEAD: NOT PRESENT
VISUAL DISTURBANCES: NOT PRESENT
AUDITORY DISTURBANCES: NOT PRESENT
ANXIETY: 3
ANXIETY: MILDLY ANXIOUS
ANXIETY: MILDLY ANXIOUS
HEADACHE, FULLNESS IN HEAD: NOT PRESENT
VISUAL DISTURBANCES: NOT PRESENT
ORIENTATION AND CLOUDING OF SENSORIUM: ORIENTED AND CAN DO SERIAL ADDITIONS
TREMOR: 2
TOTAL SCORE: 12
ANXIETY: MILDLY ANXIOUS
HOW OFTEN DURING THE LAST YEAR HAVE YOU BEEN UNABLE TO REMEMBER WHAT HAPPENED THE NIGHT BEFORE BECAUSE YOU HAD BEEN DRINKING: NEVER
ORIENTATION AND CLOUDING OF SENSORIUM: ORIENTED AND CAN DO SERIAL ADDITIONS
ORIENTATION AND CLOUDING OF SENSORIUM: ORIENTED AND CAN DO SERIAL ADDITIONS
TOTAL SCORE: 3
HEADACHE, FULLNESS IN HEAD: NOT PRESENT
HAS A RELATIVE, FRIEND, DOCTOR, OR ANOTHER HEALTH PROFESSIONAL EXPRESSED CONCERN ABOUT YOUR DRINKING OR SUGGESTED YOU CUT DOWN: NO
AGITATION: NORMAL ACTIVITY
PAROXYSMAL SWEATS: NO SWEAT VISIBLE
VISUAL DISTURBANCES: NOT PRESENT
NAUSEA AND VOMITING: NO NAUSEA AND NO VOMITING
TREMOR: NOT VISIBLE, BUT CAN BE FELT FINGERTIP TO FINGERTIP
VISUAL DISTURBANCES: NOT PRESENT
ANXIETY: NO ANXIETY, AT EASE
PAROXYSMAL SWEATS: NO SWEAT VISIBLE
HEADACHE, FULLNESS IN HEAD: NOT PRESENT
AUDITORY DISTURBANCES: NOT PRESENT
NAUSEA AND VOMITING: NO NAUSEA AND NO VOMITING
VISUAL DISTURBANCES: NOT PRESENT
ORIENTATION AND CLOUDING OF SENSORIUM: ORIENTED AND CAN DO SERIAL ADDITIONS
TREMOR: NOT VISIBLE, BUT CAN BE FELT FINGERTIP TO FINGERTIP
HOW OFTEN DO YOU HAVE A DRINK CONTAINING ALCOHOL: 4 OR MORE TIMES A WEEK
HOW OFTEN DO YOU HAVE 6 OR MORE DRINKS ON ONE OCCASION: WEEKLY
VISUAL DISTURBANCES: NOT PRESENT

## 2025-03-22 ASSESSMENT — PAIN - FUNCTIONAL ASSESSMENT
PAIN_FUNCTIONAL_ASSESSMENT: 0-10
PAIN_FUNCTIONAL_ASSESSMENT: 0-10

## 2025-03-22 ASSESSMENT — PATIENT HEALTH QUESTIONNAIRE - PHQ9
SUM OF ALL RESPONSES TO PHQ9 QUESTIONS 1 & 2: 0
1. LITTLE INTEREST OR PLEASURE IN DOING THINGS: NOT AT ALL
2. FEELING DOWN, DEPRESSED OR HOPELESS: NOT AT ALL

## 2025-03-22 ASSESSMENT — COLUMBIA-SUICIDE SEVERITY RATING SCALE - C-SSRS
2. HAVE YOU ACTUALLY HAD ANY THOUGHTS OF KILLING YOURSELF?: NO
6. HAVE YOU EVER DONE ANYTHING, STARTED TO DO ANYTHING, OR PREPARED TO DO ANYTHING TO END YOUR LIFE?: NO
1. IN THE PAST MONTH, HAVE YOU WISHED YOU WERE DEAD OR WISHED YOU COULD GO TO SLEEP AND NOT WAKE UP?: NO

## 2025-03-22 ASSESSMENT — PAIN SCALES - GENERAL
PAINLEVEL_OUTOF10: 5 - MODERATE PAIN
PAINLEVEL_OUTOF10: 3
PAINLEVEL_OUTOF10: 5 - MODERATE PAIN

## 2025-03-22 ASSESSMENT — COGNITIVE AND FUNCTIONAL STATUS - GENERAL
MOBILITY SCORE: 24
PATIENT BASELINE BEDBOUND: NO
DAILY ACTIVITIY SCORE: 24

## 2025-03-22 ASSESSMENT — PAIN DESCRIPTION - DESCRIPTORS
DESCRIPTORS: ACHING
DESCRIPTORS: ACHING

## 2025-03-22 ASSESSMENT — ACTIVITIES OF DAILY LIVING (ADL)
HEARING - RIGHT EAR: FUNCTIONAL
JUDGMENT_ADEQUATE_SAFELY_COMPLETE_DAILY_ACTIVITIES: YES
TOILETING: INDEPENDENT
PATIENT'S MEMORY ADEQUATE TO SAFELY COMPLETE DAILY ACTIVITIES?: YES
GROOMING: INDEPENDENT
HEARING - LEFT EAR: FUNCTIONAL
WALKS IN HOME: INDEPENDENT
DRESSING YOURSELF: INDEPENDENT
ADEQUATE_TO_COMPLETE_ADL: YES
FEEDING YOURSELF: INDEPENDENT
LACK_OF_TRANSPORTATION: NO
BATHING: INDEPENDENT

## 2025-03-22 ASSESSMENT — PAIN DESCRIPTION - ORIENTATION: ORIENTATION: LEFT

## 2025-03-22 NOTE — ED TRIAGE NOTES
Pt reports left shoulder pain and chest pain since waking up this morning. Pt reports 5/10 aching pain. Pt denies SOB. Pt denies past cardiac hx.

## 2025-03-22 NOTE — ED PROVIDER NOTES
HPI   Chief Complaint   Patient presents with    Chest Pain    Shoulder Pain       HPI  59-year-old male patient who presents with left shoulder pain that started around 8 AM when he woke up this morning.  Patient reports that last night he had 5 whiskey drinks, smoked a cigar, this is typical for him, denies any illicit drug use. He has never been in A-fib with RVR. He reports that when he woke up this morning, he had 5/10 left shoulder pain, nonexertional. Denies significant chest pain.  Reports he has history of hypertension, family history of significant young heart attack in his father. On my initial evaluation, patient is sweaty, he reports diaphoresis throughout the morning, given his pain, unwell appearance, his family recommended he come to the ED. He took 2 baby aspirin prior to arrival.    His daughter arrived at bedside within the hour, provided additional history collateral, patient has history of hypertension, but has not been taking his losartan as his blood pressures have been running low, has been taking his GLP-1 that he started 1.5 months ago for weight loss purposes, does not have diabetes. Last A1C 5.8 7/17/24.    Patient History   Past Medical History:   Diagnosis Date    Hypertension      History reviewed. No pertinent surgical history.  Family History   Problem Relation Name Age of Onset    Diabetes Mother      Cancer Father      Diabetes Sister      Diabetes Brother       Social History     Tobacco Use    Smoking status: Never    Smokeless tobacco: Never   Vaping Use    Vaping status: Never Used   Substance Use Topics    Alcohol use: Not Currently     Alcohol/week: 16.0 standard drinks of alcohol     Types: 16 Shots of liquor per week     Comment: 3-4 drinks 3-4x per week    Drug use: Never       Physical Exam   ED Triage Vitals [03/22/25 1225]   Temperature Heart Rate Respirations BP   36.2 °C (97.2 °F) (!) 155 19 135/71      Pulse Ox Temp Source Heart Rate Source Patient Position   97 %  Temporal Monitor --      BP Location FiO2 (%)     Right arm --       Physical Exam  Vitals and nursing note reviewed.   Constitutional:       General: He is not in acute distress.     Appearance: He is well-developed. He is obese. He is ill-appearing and diaphoretic.      Comments: Appears uncomfortable, on room air   HENT:      Head: Normocephalic and atraumatic.   Eyes:      Conjunctiva/sclera: Conjunctivae normal.   Cardiovascular:      Rate and Rhythm: Tachycardia present. Rhythm irregular.      Pulses:           Radial pulses are 2+ on the right side and 2+ on the left side.        Dorsalis pedis pulses are 2+ on the right side and 2+ on the left side.      Heart sounds: Normal heart sounds.   Pulmonary:      Effort: Pulmonary effort is normal. No respiratory distress.      Breath sounds: Normal breath sounds.   Chest:      Chest wall: Tenderness (Tender over the left shoulder girdle diffusely) present. No mass.   Abdominal:      Palpations: Abdomen is soft.      Tenderness: There is no abdominal tenderness.   Musculoskeletal:         General: No swelling.      Cervical back: Neck supple.   Skin:     General: Skin is warm.      Capillary Refill: Capillary refill takes less than 2 seconds.   Neurological:      Mental Status: He is alert.   Psychiatric:         Mood and Affect: Mood normal.       ED Course & MDM   ED Course as of 03/22/25 1745   Sat Mar 22, 2025   1250 ECG 12 lead  EKG shows atrial fibrillation with RVR, Q waves in V1, V2, no obvious acute ischemic changes or ST changes []   1251 BP: 135/71  Patient's initial blood pressure in room was SBP 70s, immediately on repeat, MAP greater than 70, he is not anticoagulated, I am concerned that he is generally unstable, reports never having had A-fib before.  Given his diaphoresis, unwell appearance, family history, hypertension, social cigar smoking, obesity, he has many risk factors that this may indeed be ACS.  I am sending for a CT angio chest.  In  addition, I am concerned that withdrawal may be playing a role, and therefore proceeding with CIWA in addition to NS bolus 1 L, magnesium repletion []   1352 Troponin I, High Sensitivity: 3  Reassuring []   1400 D-Dimer Non VTE, Quant (ng/mL FEU): 273  Negative, low Wells, generally rules out PE.  Will proceed with a CT angio []   1409 Still bouncing between 120s and 140s, blood pressure improved, patient is reporting that his pain is worsening in his left shoulder, moving to his right arm.  His troponin draw was greater than 3 hours after his symptoms started, serial EKGs do not show evolving changes.  I generally recommended that patient allow us to admit him.  I will get a CT angio to assess for dissection []   1420 Repeat EKG shows heart rate 103, Q waves in septum still present, T wave inversion in aVF, otherwise nonischemic consistent with patient's negative troponins.  Does appear to be better rate controlled less than 110 []   1640 I discussed with patient that he has a negative CT angio for PE or dissection reassuringly.  He does report he is feeling better.  He remains in A-fib, between 100s to 130s, giving second dose of metoprolol 5 mg, starting on lovenox []      ED Course User Index  [] Scottie Mcknight MD         Diagnoses as of 03/22/25 1745   Atrial fibrillation with RVR (Multi)   Chest pain, unspecified type                 No data recorded     [place] Coma Scale Score: 15 (03/22/25 1250 : Dheeraj Ceron, RN)                           Medical Decision Making  Patient arrives appearing diaphoretic, unwell, questionably low blood pressures that did self resolve. Not having crushing chest pain or severe shortness of breath.  He is a heavy drinker recreationally. He is in a new A-fib with RVR. I have rate controlled him to the 100s with metoprolol 5 mg.  I have also done CIWA with magnesium repletion, thiamine, Ativan 2 mg x 1.  Given 1 L of fluid as patient does not appear to be fluid  overloaded.  BNP normal.  Creatinine 1.48, up from baseline 1.2. HR back up to 120s, giving a second dose of metoprolol 5 mg IV, HR responding to the 90s. Discussed plan of care with patient, admitting to stepdown as patient may have occult HF and may need dig load vs esmolol gtt if needing repeat dosing of metoprolol 5 mg IV.    HEART score 4, troponins negative, EKG's non-ischemic.    CI:  1. Atrial fibrillation with RVR (Multi)  Transthoracic Echo (TTE) Complete    Transthoracic Echo (TTE) Complete      2. Chest pain, unspecified type        Dispo:  Admit to step-down    Procedure  Procedures     Scottie Mcknight MD  03/22/25 4370

## 2025-03-22 NOTE — H&P
History Of Present Illness  Wyatt Malave is a 59 y.o. male with PMHx of hypertension, presenting with left shoulder pain.  Patient states he woke up with left shoulder pain 5/10 radiating to neck associated with toothache.  He also endorsed diaphoresis, dizziness and blurred vision.  However he denies chest pain, shortness of breath, cough, lower limb swelling.  Patient also denies fever, abdominal pain, nausea or vomiting, change in bowel or urinary habits.  Patient reports he drinks alcohol 3-4 times per week 3-4 liquor.  He also smokes 1 cigar/day.  Last drink yesterday night around 7:30 PM about 4-5 cocktail.  He has family history of cardiac disease in his father who has heart attack.  Patient denies similar symptoms.  Of note he has hypertension on losartan however he is not compliant to his medication he stopped losartan and resumed with again in the last 4 days.    ED course:  -He was afebrile, tachycardic , /86, RR 16, SaO2 94% in room air.  -CMP remarkable for creatinine 1.48, troponin negative x 2, BNP 56.  CBC remarkable for leukocytosis 13.2, Hgb 18.7.    -EKG reveals atrial fibrillation with RVR , no ischemic changes, QTc 469 .  -Chest x-ray shows no acute cardiopulmonary process.  -CT angio chest with and without contrast reveals no evidence for thoracic aortic dissection or aneurysmal dilation, no evidence for pulmonary embolism.  Does show coronary artery calcifications.  -Intervention: Patient received 1 L of LR, metoprolol and Ativan and admitted to stepdown for further management      Past Medical History  He has a past medical history of Hypertension.    Surgical History  He has no past surgical history on file.     Social History  He reports that he has never smoked. He has never used smokeless tobacco. He reports that he does not currently use alcohol after a past usage of about 16.0 standard drinks of alcohol per week. He reports that he does not use drugs.    Family  History  Family History   Problem Relation Name Age of Onset    Diabetes Mother      Cancer Father      Diabetes Sister      Diabetes Brother          Allergies  Patient has no known allergies.    Review of Systems  Review of system was negative apart of as noted in HPI  Physical Exam  Constitutional:       Appearance: Normal appearance.   HENT:      Head: Normocephalic and atraumatic.      Mouth/Throat:      Mouth: Mucous membranes are moist.   Cardiovascular:      Rate and Rhythm: Tachycardia present. Rhythm irregular.      Heart sounds: No murmur heard.     No gallop.   Pulmonary:      Effort: No respiratory distress.      Breath sounds: No rhonchi.   Abdominal:      Palpations: Abdomen is soft.      Tenderness: There is no abdominal tenderness.   Musculoskeletal:      Right lower leg: No edema.      Left lower leg: No edema.   Skin:     General: Skin is warm.   Neurological:      Mental Status: He is alert and oriented to person, place, and time.      Cranial Nerves: No cranial nerve deficit.      Sensory: No sensory deficit.      Motor: No weakness.   Psychiatric:         Mood and Affect: Mood normal.        Last Recorded Vitals  /89   Pulse (!) 103   Temp 36.2 °C (97.2 °F) (Temporal)   Resp 16   Wt 117 kg (259 lb)   SpO2 95%     Relevant Results  Scheduled medications  [START ON 3/23/2025] enoxaparin, 1 mg/kg, subcutaneous, BID  folic acid, 1 mg, oral, Daily  [Held by provider] losartan 50 mg, hydroCHLOROthiazide 12.5 mg for Hyzaar 50 mg-12.5 mg, , oral, Daily  metoprolol tartrate, 25 mg, oral, BID  [START ON 3/25/2025] thiamine, 100 mg, oral, Daily  thiamine, 100 mg, intravenous, Daily  vitamin B complex-vitamin C-folic acid, 1 capsule, oral, Daily      Continuous medications     PRN medications  PRN medications: metoprolol  Results for orders placed or performed during the hospital encounter of 03/22/25 (from the past 24 hours)   ECG 12 lead   Result Value Ref Range    Ventricular Rate 155 BPM     Atrial Rate 174 BPM    QRS Duration 80 ms    QT Interval 292 ms    QTC Calculation(Bazett) 469 ms    R Axis -66 degrees    T Axis 63 degrees    QRS Count 26 beats    Q Onset 218 ms    T Offset 364 ms    QTC Fredericia 400 ms   CBC and Auto Differential   Result Value Ref Range    WBC 13.2 (H) 4.4 - 11.3 x10*3/uL    nRBC 0.0 0.0 - 0.0 /100 WBCs    RBC 6.40 (H) 4.50 - 5.90 x10*6/uL    Hemoglobin 18.7 (H) 13.5 - 17.5 g/dL    Hematocrit 54.9 (H) 41.0 - 52.0 %    MCV 86 80 - 100 fL    MCH 29.2 26.0 - 34.0 pg    MCHC 34.1 32.0 - 36.0 g/dL    RDW 12.9 11.5 - 14.5 %    Platelets 288 150 - 450 x10*3/uL    Neutrophils % 73.6 40.0 - 80.0 %    Immature Granulocytes %, Automated 0.3 0.0 - 0.9 %    Lymphocytes % 14.8 13.0 - 44.0 %    Monocytes % 10.0 2.0 - 10.0 %    Eosinophils % 0.8 0.0 - 6.0 %    Basophils % 0.5 0.0 - 2.0 %    Neutrophils Absolute 9.74 (H) 1.20 - 7.70 x10*3/uL    Immature Granulocytes Absolute, Automated 0.04 0.00 - 0.70 x10*3/uL    Lymphocytes Absolute 1.95 1.20 - 4.80 x10*3/uL    Monocytes Absolute 1.32 (H) 0.10 - 1.00 x10*3/uL    Eosinophils Absolute 0.11 0.00 - 0.70 x10*3/uL    Basophils Absolute 0.06 0.00 - 0.10 x10*3/uL   B-type natriuretic peptide   Result Value Ref Range    BNP 56 0 - 99 pg/mL   Troponin I, High Sensitivity, Initial   Result Value Ref Range    Troponin I, High Sensitivity 3 0 - 20 ng/L   D-dimer, quantitative   Result Value Ref Range    D-Dimer Non VTE, Quant (ng/mL FEU) 273 <=500 ng/mL FEU   Comprehensive Metabolic Panel   Result Value Ref Range    Glucose 98 74 - 99 mg/dL    Sodium 137 136 - 145 mmol/L    Potassium 3.9 3.5 - 5.3 mmol/L    Chloride 102 98 - 107 mmol/L    Bicarbonate 21 21 - 32 mmol/L    Anion Gap 18 10 - 20 mmol/L    Urea Nitrogen 26 (H) 6 - 23 mg/dL    Creatinine 1.48 (H) 0.50 - 1.30 mg/dL    eGFR 54 (L) >60 mL/min/1.73m*2    Calcium 9.7 8.6 - 10.3 mg/dL    Albumin 4.4 3.4 - 5.0 g/dL    Alkaline Phosphatase 43 33 - 136 U/L    Total Protein 7.0 6.4 - 8.2 g/dL    AST 24  9 - 39 U/L    Bilirubin, Total 1.4 (H) 0.0 - 1.2 mg/dL    ALT 23 10 - 52 U/L   Magnesium   Result Value Ref Range    Magnesium 2.05 1.60 - 2.40 mg/dL   Troponin, High Sensitivity, 1 Hour   Result Value Ref Range    Troponin I, High Sensitivity <3 0 - 20 ng/L     CT angio chest w and wo IV contrast    Result Date: 3/22/2025  STUDY: CT Angiogram of the Chest; 03/22/2025 3:55 PM INDICATION: Assess for dissection with worsening pain in bilateral arms despite normal troponin and EKG in setting of new atrial fibrillation with .4. COMPARISON: None. ACCESSION NUMBER(S): [id number] ORDERING CLINICIAN: GENARO DIAS TECHNIQUE:  CTA of the chest was performed without and with intravenous contrast.  Images are reviewed and processed at a workstation according to the CT angiogram protocol with 3-D and/or MIP post processing imaging generated.  90 mL Omnipaque-350 was administered intravenously.  1726 DICOM images received. Automated mA/kV exposure control was utilized and patient examination was performed in strict accordance with principles of ALARA. FINDINGS: Pulmonary arteries are adequately opacified without acute or chronic filling defects.  The thoracic aorta is normal in course and caliber without dissection or aneurysm.  Coronary artery calcifications are present. The heart is normal in size without pericardial effusion.  Thoracic lymph nodes are not enlarged. There is no pleural effusion, pleural thickening, or pneumothorax. The airways are patent. Lungs are clear without consolidation, interstitial disease, or suspicious nodules. Upper abdomen demonstrates no acute pathology. There are no acute fractures.  No suspicious bony lesions.    1.  No evidence for thoracic aortic dissection or aneurysmal dilatation. 2.  No evidence for pulmonary embolus or acute lung parenchymal process. 3.  Coronary artery calcifications. Signed by Rafa Ellison MD    XR chest 1 view    Result Date: 3/22/2025  STUDY: Chest  Radiograph;  3/22/2025 at 1:54 PM. INDICATION: Chest pain. COMPARISON: None available. ACCESSION NUMBER(S): [id number] ORDERING CLINICIAN: GENARO DIAS TECHNIQUE:  Frontal chest was obtained at 13:54 hours (two images). FINDINGS: CARDIOMEDIASTINAL SILHOUETTE: Cardiomediastinal silhouette is normal in size and configuration.  LUNGS: Lungs are clear.  There is no evidence of pneumothorax or pleural effusion.  ABDOMEN: No remarkable upper abdominal findings.  BONES: No acute osseous changes.    No acute cardiopulmonary process. Signed by Rafa Ellison MD    ECG 12 lead    Result Date: 3/22/2025  Atrial fibrillation with rapid ventricular response Left axis deviation Septal infarct , age undetermined Abnormal ECG No previous ECGs available        Assessment/Plan   59-year-old male with PMHx of hypertension and untreated BOSSMAN.  He presented to ED with left shoulder pain 4-5/10 radiating to neck, pain associated with diaphoresis, dizziness and blurred vision.  Patient denies chest pain and shortness of breath.  Patient reports he drinks alcohol 3-4 times per week about 3-4 liquor per day.  Last drink yesterday night around 7:30 PM about 4-5 cocktails.  In ED he was afebrile , /86, RR 16, SaO2 94% in room air.  CMP remarkable for creatinine 1.48 [baseline 1.1].,  CBC remarkable for leukocytosis 13.2, Hgb 18.7.  EKG reveals atrial fibrillation with RVR , no ischemic changes, QTc 469.  Chest x-ray unremarkable.  CT angio chest rule out pulmonary embolism and aortic dissection.  Patient received metoprolol and Ativan which bring heart rate down.  CIWA protocol was activated to evaluate CIWA score frequently.    #New onset atrial fibrillation with RVR (REINIER-VASCs 1)  #Heavy alcohol drinking  #Hypertension  #Untreated BOSSMAN  Patient came with left shoulder pain noted with diaphoresis, dizziness, blurry vision.  -Patient drinks alcohol 3-4 times per week about 3-4 liquor per day.    -Last drink yesterday 7:30  PM 4-5 drinks  EKG showed A-fib with  RVR ,, no ischemic changes.  Chest x-ray unremarkable, CT angiogram rule out aortic dissection, PE    Plan:  -Telemetry bed  -Metoprolol 25 mg twice daily  -Echocardiogram to check LV function  -Risk stratification: Lipid panel, TSH, Hgb A1c  -Despite low REINIER-VASCs score we will proceed with anticoagulation in setting of high risk: High BMI, smoking  -Patient is calm now we will hold on CIWA protocol for now. Will activate CIWA if patient started agitated.      #Left shoulder pain  -more with  abduction. Mild tenderness in deltoid muscle. No weakness    Plan:  - Left shoulder x-ray  - pain meds: Tylenol for mild pain, oxycodone for moderate and severe pain, lidocaine patch    #Acute kidney injury  Most likely prerenal due to dehydration and low effective blood flow to kidney in setting of fast heart rate.    Plan:  -I&Os  -Avoid nephrotoxin drug  -Hold losartan/hydrochlorothiazide  -Daily RFP    #Mild leukocytosis  WBC 13.2 most likely due to stress.  Hgb 18.7 most likely due to untreated BOSSMAN or may be due to dehydration.  -Will continue monitor CBC    Johnathan Gonzalez MD  Internal medicine, PGY1    __________________________________________________________  Wyatt Malave is a 59-year-old male with a past medical history of hypertension who [place] [facility] [place] with a complaint of left-sided shoulder pain with radiation into the neck that began earlier this afternoon.  Patient reports using his home sauna to try to alleviate the pain however no significant relief.  He then tried to take a hot shower however states that the pain worsened prompting ED evaluation.  He endorses feeling diaphoretic however denied any chest pain, shortness of breath, palpitations, abdominal pain, nausea, vomiting, diarrhea.  Denies any similar symptoms in the past.  He does report a family history of cardiac disease in his father who suffered from a  heart attack less than 65 years of age.  Daughter does report giving the patient aspirin prior to arrival.  He does endorse frequent alcohol use however denies any prior history of alcohol withdrawals, withdrawal seizures, or DTs.  Last alcoholic drink was yesterday around 1900.  Of note, patient's daughter also states that the patient has a undiagnosed history of obstructive sleep apnea noting frequent apneic episodes with cyanotic lips and hypoxia in the 80s while sleeping.  Patient has never undergone formal sleep evaluation.  In the emergency department the patient was afebrile however tachycardic between 150 and 170.  Respiratory rate 19, blood pressure 135/71, SpO2 97% on room air.  CBC with mild leukocytosis of 13.2, hemoglobin 18.7, platelets 288.  CMP with normal electrolytes.  BUN 26, creatinine 1.48.  Normal LFTs.  Troponin negative x 2.  Chest x-ray without any acute cardiopulmonary process.  CT angio chest negative for aortic dissection, aneurysmal dilatation, pulmonary embolism, or acute lung process.  The patient was given 1 single dose of IV metoprolol a 5 mg and his heart rate improved.  The patient also received 1 L IV fluids, magnesium, and Lovenox 1 mg/kg.   On my exam the patient is awake alert comfortable in no apparent distress.  Heart is irregularly irregular.  On telemetry heart rate is ranging between 80 and 120.  Lungs are clear to auscultation bilaterally.  The left shoulder has mild tenderness to palpation to the deltoid and trapezius area with mildly limited ROM.  Overall this is a 59-year-old male with a prior medical history of hypertension and alcohol use who presented to the who presented to the [facility] ED with complaint of left shoulder pain found to have new onset atrial fibrillation with rapid ventricular response improved after one-time dose of IV Lopressor 5 mg.  For this we will start low-dose beta-blocker with metoprolol 25 mg twice daily to receive the first dose now.  We will  have as needed orders for IV Lopressor every 15 minutes for heart rate sustaining over 130bpm x 3 doses.  Should the patient's RVR persist we could consider digoxin loading versus diltiazem drip.  I would like to obtain a complete echocardiogram as the patient has not had a previous echo.  His [id number] score is 1 for history of hypertension. However, due to the patient's risk factors of EtOH use, tobacco use, obesity, and undiagnosed BOSSMAN would recommend anticoagulation for stroke prevention. Will start Lovenox 1mg/kg and can likely switch to Eliquis prior to discharge. We will also obtain an echocardiogram.  For the patient's left shoulder pain we will obtain left shoulder x-ray at this time.  We will also start pain control with lidocaine patch and as needed Tylenol and oxycodone.  For the patient's EUGENIE we will hold home lisinopril/HCTZ.  He received 1 L IV fluids in the ED.  Will reassess renal function with a.m. BMP.  Patient to be admitted to stepdown unit for A-fib RVR.    Park Romero, DO  Internal medicine, PGY 3

## 2025-03-23 ENCOUNTER — APPOINTMENT (OUTPATIENT)
Dept: CARDIOLOGY | Facility: HOSPITAL | Age: 60
DRG: 309 | End: 2025-03-23
Payer: MEDICARE

## 2025-03-23 LAB
ALBUMIN SERPL BCP-MCNC: 4 G/DL (ref 3.4–5)
ALP SERPL-CCNC: 39 U/L (ref 33–136)
ALT SERPL W P-5'-P-CCNC: 18 U/L (ref 10–52)
ANION GAP SERPL CALC-SCNC: 11 MMOL/L (ref 10–20)
AORTIC VALVE PEAK VELOCITY: 1.09 M/S
AST SERPL W P-5'-P-CCNC: 17 U/L (ref 9–39)
AV PEAK GRADIENT: 5 MMHG
AVA (PEAK VEL): 3.84 CM2
BILIRUB DIRECT SERPL-MCNC: 0.2 MG/DL (ref 0–0.3)
BILIRUB SERPL-MCNC: 1.1 MG/DL (ref 0–1.2)
BUN SERPL-MCNC: 23 MG/DL (ref 6–23)
CALCIUM SERPL-MCNC: 9.1 MG/DL (ref 8.6–10.3)
CHLORIDE SERPL-SCNC: 102 MMOL/L (ref 98–107)
CHOLEST SERPL-MCNC: 144 MG/DL (ref 0–199)
CHOLESTEROL/HDL RATIO: 3.7
CO2 SERPL-SCNC: 26 MMOL/L (ref 21–32)
CREAT SERPL-MCNC: 1.13 MG/DL (ref 0.5–1.3)
EGFRCR SERPLBLD CKD-EPI 2021: 74 ML/MIN/1.73M*2
EJECTION FRACTION APICAL 4 CHAMBER: 53.3
EJECTION FRACTION: 50 %
ERYTHROCYTE [DISTWIDTH] IN BLOOD BY AUTOMATED COUNT: 13 % (ref 11.5–14.5)
GLUCOSE SERPL-MCNC: 94 MG/DL (ref 74–99)
HCT VFR BLD AUTO: 53.7 % (ref 41–52)
HDLC SERPL-MCNC: 39.4 MG/DL
HGB BLD-MCNC: 17.5 G/DL (ref 13.5–17.5)
LDLC SERPL CALC-MCNC: 83 MG/DL
LEFT VENTRICLE INTERNAL DIMENSION DIASTOLE: 4.96 CM (ref 3.5–6)
LEFT VENTRICULAR OUTFLOW TRACT DIAMETER: 2.34 CM
LV EJECTION FRACTION BIPLANE: 48 %
MAGNESIUM SERPL-MCNC: 1.96 MG/DL (ref 1.6–2.4)
MCH RBC QN AUTO: 28.7 PG (ref 26–34)
MCHC RBC AUTO-ENTMCNC: 32.6 G/DL (ref 32–36)
MCV RBC AUTO: 88 FL (ref 80–100)
NON HDL CHOLESTEROL: 105 MG/DL (ref 0–149)
NRBC BLD-RTO: 0 /100 WBCS (ref 0–0)
PLATELET # BLD AUTO: 206 X10*3/UL (ref 150–450)
POTASSIUM SERPL-SCNC: 3.6 MMOL/L (ref 3.5–5.3)
PROT SERPL-MCNC: 6.5 G/DL (ref 6.4–8.2)
RBC # BLD AUTO: 6.09 X10*6/UL (ref 4.5–5.9)
RIGHT VENTRICLE FREE WALL PEAK S': 13 CM/S
RIGHT VENTRICLE PEAK SYSTOLIC PRESSURE: 22.2 MMHG
SODIUM SERPL-SCNC: 135 MMOL/L (ref 136–145)
TRICUSPID ANNULAR PLANE SYSTOLIC EXCURSION: 1.8 CM
TRIGL SERPL-MCNC: 107 MG/DL (ref 0–149)
VLDL: 21 MG/DL (ref 0–40)
WBC # BLD AUTO: 8.3 X10*3/UL (ref 4.4–11.3)

## 2025-03-23 PROCEDURE — 5A2204Z RESTORATION OF CARDIAC RHYTHM, SINGLE: ICD-10-PCS | Performed by: INTERNAL MEDICINE

## 2025-03-23 PROCEDURE — 93306 TTE W/DOPPLER COMPLETE: CPT | Performed by: INTERNAL MEDICINE

## 2025-03-23 PROCEDURE — 2500000005 HC RX 250 GENERAL PHARMACY W/O HCPCS

## 2025-03-23 PROCEDURE — 2500000004 HC RX 250 GENERAL PHARMACY W/ HCPCS (ALT 636 FOR OP/ED)

## 2025-03-23 PROCEDURE — 2500000001 HC RX 250 WO HCPCS SELF ADMINISTERED DRUGS (ALT 637 FOR MEDICARE OP)

## 2025-03-23 PROCEDURE — 36415 COLL VENOUS BLD VENIPUNCTURE: CPT

## 2025-03-23 PROCEDURE — 85027 COMPLETE CBC AUTOMATED: CPT

## 2025-03-23 PROCEDURE — 2500000002 HC RX 250 W HCPCS SELF ADMINISTERED DRUGS (ALT 637 FOR MEDICARE OP, ALT 636 FOR OP/ED): Performed by: INTERNAL MEDICINE

## 2025-03-23 PROCEDURE — 83735 ASSAY OF MAGNESIUM: CPT

## 2025-03-23 PROCEDURE — 93306 TTE W/DOPPLER COMPLETE: CPT

## 2025-03-23 PROCEDURE — 80061 LIPID PANEL: CPT

## 2025-03-23 PROCEDURE — 2060000001 HC INTERMEDIATE ICU ROOM DAILY

## 2025-03-23 PROCEDURE — 80053 COMPREHEN METABOLIC PANEL: CPT

## 2025-03-23 PROCEDURE — 2500000001 HC RX 250 WO HCPCS SELF ADMINISTERED DRUGS (ALT 637 FOR MEDICARE OP): Performed by: INTERNAL MEDICINE

## 2025-03-23 PROCEDURE — 99222 1ST HOSP IP/OBS MODERATE 55: CPT | Performed by: INTERNAL MEDICINE

## 2025-03-23 PROCEDURE — 82248 BILIRUBIN DIRECT: CPT

## 2025-03-23 RX ORDER — METOPROLOL TARTRATE 25 MG/1
25 TABLET, FILM COATED ORAL ONCE
Status: COMPLETED | OUTPATIENT
Start: 2025-03-23 | End: 2025-03-23

## 2025-03-23 RX ORDER — NAPROXEN SODIUM 220 MG/1
81 TABLET, FILM COATED ORAL DAILY
Status: DISCONTINUED | OUTPATIENT
Start: 2025-03-23 | End: 2025-03-24 | Stop reason: HOSPADM

## 2025-03-23 RX ORDER — POTASSIUM CHLORIDE 20 MEQ/1
40 TABLET, EXTENDED RELEASE ORAL ONCE
Status: COMPLETED | OUTPATIENT
Start: 2025-03-23 | End: 2025-03-23

## 2025-03-23 RX ORDER — ATORVASTATIN CALCIUM 40 MG/1
40 TABLET, FILM COATED ORAL NIGHTLY
Status: DISCONTINUED | OUTPATIENT
Start: 2025-03-23 | End: 2025-03-24 | Stop reason: HOSPADM

## 2025-03-23 RX ORDER — METOPROLOL TARTRATE 50 MG/1
50 TABLET ORAL 2 TIMES DAILY
Status: DISCONTINUED | OUTPATIENT
Start: 2025-03-23 | End: 2025-03-24 | Stop reason: HOSPADM

## 2025-03-23 RX ORDER — DICLOFENAC SODIUM 10 MG/G
4 GEL TOPICAL 4 TIMES DAILY PRN
Status: DISCONTINUED | OUTPATIENT
Start: 2025-03-23 | End: 2025-03-24 | Stop reason: HOSPADM

## 2025-03-23 RX ADMIN — DICLOFENAC SODIUM 4 G: 10 GEL TOPICAL at 18:38

## 2025-03-23 RX ADMIN — OXYCODONE HYDROCHLORIDE 5 MG: 5 TABLET ORAL at 21:37

## 2025-03-23 RX ADMIN — POTASSIUM CHLORIDE 40 MEQ: 1500 TABLET, EXTENDED RELEASE ORAL at 13:49

## 2025-03-23 RX ADMIN — LIDOCAINE 1 PATCH: 4 PATCH TOPICAL at 09:26

## 2025-03-23 RX ADMIN — THIAMINE HYDROCHLORIDE 100 MG: 100 INJECTION, SOLUTION INTRAMUSCULAR; INTRAVENOUS at 09:25

## 2025-03-23 RX ADMIN — ACETAMINOPHEN 975 MG: 325 TABLET ORAL at 04:40

## 2025-03-23 RX ADMIN — OXYCODONE HYDROCHLORIDE 5 MG: 5 TABLET ORAL at 14:39

## 2025-03-23 RX ADMIN — ATORVASTATIN CALCIUM 40 MG: 40 TABLET, FILM COATED ORAL at 20:20

## 2025-03-23 RX ADMIN — ACETAMINOPHEN 975 MG: 325 TABLET ORAL at 11:47

## 2025-03-23 RX ADMIN — Medication 1 CAPSULE: at 09:24

## 2025-03-23 RX ADMIN — APIXABAN 5 MG: 5 TABLET, FILM COATED ORAL at 20:20

## 2025-03-23 RX ADMIN — ENOXAPARIN SODIUM 120 MG: 120 INJECTION SUBCUTANEOUS at 09:25

## 2025-03-23 RX ADMIN — METOPROLOL TARTRATE 50 MG: 50 TABLET, FILM COATED ORAL at 20:20

## 2025-03-23 RX ADMIN — OXYCODONE HYDROCHLORIDE 5 MG: 5 TABLET ORAL at 04:40

## 2025-03-23 RX ADMIN — ASPIRIN 81 MG CHEWABLE TABLET 81 MG: 81 TABLET CHEWABLE at 18:38

## 2025-03-23 RX ADMIN — METOPROLOL TARTRATE 25 MG: 25 TABLET, FILM COATED ORAL at 09:24

## 2025-03-23 RX ADMIN — LOSARTAN POTASSIUM: 50 TABLET, FILM COATED ORAL at 09:24

## 2025-03-23 RX ADMIN — METOPROLOL TARTRATE 25 MG: 25 TABLET, FILM COATED ORAL at 12:56

## 2025-03-23 RX ADMIN — FOLIC ACID 1 MG: 1 TABLET ORAL at 09:24

## 2025-03-23 ASSESSMENT — COGNITIVE AND FUNCTIONAL STATUS - GENERAL
DAILY ACTIVITIY SCORE: 24
MOBILITY SCORE: 24

## 2025-03-23 ASSESSMENT — PAIN DESCRIPTION - ORIENTATION
ORIENTATION: LEFT
ORIENTATION: LEFT

## 2025-03-23 ASSESSMENT — PAIN - FUNCTIONAL ASSESSMENT
PAIN_FUNCTIONAL_ASSESSMENT: 0-10

## 2025-03-23 ASSESSMENT — PAIN DESCRIPTION - DESCRIPTORS
DESCRIPTORS: ACHING

## 2025-03-23 ASSESSMENT — ACTIVITIES OF DAILY LIVING (ADL)
EFFECT OF PAIN ON DAILY ACTIVITIES: HURTS TO MOVE

## 2025-03-23 ASSESSMENT — PAIN DESCRIPTION - LOCATION
LOCATION: SHOULDER

## 2025-03-23 ASSESSMENT — PAIN SCALES - GENERAL
PAINLEVEL_OUTOF10: 0 - NO PAIN
PAINLEVEL_OUTOF10: 7
PAINLEVEL_OUTOF10: 2
PAINLEVEL_OUTOF10: 4
PAINLEVEL_OUTOF10: 4
PAINLEVEL_OUTOF10: 5 - MODERATE PAIN
PAINLEVEL_OUTOF10: 6
PAINLEVEL_OUTOF10: 7
PAINLEVEL_OUTOF10: 4
PAINLEVEL_OUTOF10: 0 - NO PAIN
PAINLEVEL_OUTOF10: 2
PAINLEVEL_OUTOF10: 8

## 2025-03-23 ASSESSMENT — PAIN SCALES - PAIN ASSESSMENT IN ADVANCED DEMENTIA (PAINAD): TOTALSCORE: MEDICATION (SEE MAR)

## 2025-03-23 NOTE — PROGRESS NOTES
Wyatt Malave is a 59 y.o. male on day 1 of admission presenting with Atrial fibrillation with RVR (Multi).      Subjective   Patient seen and examined this morning at bedside.  No acute event overnight.  Patient complaining of left shoulder pain with movement .  Patient denies chest pain, shortness of breath, dizziness.  He still on A-fib on telemetry.       Objective     Last Recorded Vitals  BP (!) 151/100 (BP Location: Right arm, Patient Position: Lying)   Pulse 94   Temp 35.4 °C (95.7 °F) (Temporal)   Resp 24   Wt 107 kg (235 lb 14.3 oz)   SpO2 97%   Intake/Output last 3 Shifts:    Intake/Output Summary (Last 24 hours) at 3/23/2025 1158  Last data filed at 3/23/2025 0400  Gross per 24 hour   Intake 1050 ml   Output 375 ml   Net 675 ml       Admission Weight  Weight: 117 kg (259 lb) (03/22/25 1225)    Daily Weight  03/22/25 : 107 kg (235 lb 14.3 oz)    Image Results  XR shoulder left 2+ views  Narrative: Interpreted By:  Marty Wong,   STUDY:  XR SHOULDER LEFT 2+ VIEWS; ;  3/22/2025 7:43 pm      INDICATION:  Shoulder pain.      COMPARISON:  None.      ACCESSION NUMBER(S):  [id number]      ORDERING CLINICIAN:  GENAOR DIAS      FINDINGS:  No acute fracture or dislocation of the left shoulder. There is left  shoulder osteoarthrosis. There is glenohumeral osteoarthrosis with  osseous spurring at the humeral head.      Impression: No acute fracture or dislocation of the left shoulder.      Left shoulder osteoarthrosis.      MACRO:  None      Signed by: Marty Wong 3/22/2025 8:14 PM  Dictation workstation:   [id number]  CT angio chest w and wo IV contrast  Narrative: STUDY:  CT Angiogram of the Chest; 03/22/2025 3:55 PM  INDICATION:  Assess for dissection with worsening pain in bilateral arms despite  normal troponin and EKG in setting of new atrial fibrillation with RVR  185.4.  COMPARISON:  None.  ACCESSION NUMBER(S):  [id number]  ORDERING CLINICIAN:  GENARO DIAS  TECHNIQUE:  CTA of the chest was  performed without and with  intravenous contrast.  Images are reviewed and processed at a  workstation according to the CT angiogram protocol with 3-D and/or MIP  post processing imaging generated.  90 mL Omnipaque-350 was  administered intravenously.  1726 DICOM images received.  Automated mA/kV exposure control was utilized and patient examination  was performed in strict accordance with principles of ALARA.  FINDINGS:  Pulmonary arteries are adequately opacified without acute or chronic  filling defects.  The thoracic aorta is normal in course and caliber  without dissection or aneurysm.  Coronary artery calcifications are  present.  The heart is normal in size without pericardial effusion.  Thoracic  lymph nodes are not enlarged.  There is no pleural effusion, pleural thickening, or pneumothorax.   The airways are patent.  Lungs are clear without consolidation, interstitial disease, or  suspicious nodules.  Upper abdomen demonstrates no acute pathology.  There are no acute fractures.  No suspicious bony lesions.  Impression: 1.  No evidence for thoracic aortic dissection or aneurysmal  dilatation.  2.  No evidence for pulmonary embolus or acute lung parenchymal  process.  3.  Coronary artery calcifications.  Signed by Rafa Ellison MD  XR chest 1 view  Narrative: STUDY:  Chest Radiograph;  3/22/2025 at 1:54 PM.  INDICATION:  Chest pain.  COMPARISON:  None available.  ACCESSION NUMBER(S):  [id number]  ORDERING CLINICIAN:  GENARO DIAS  TECHNIQUE:  Frontal chest was obtained at 13:54 hours (two images).  FINDINGS:  CARDIOMEDIASTINAL SILHOUETTE:  Cardiomediastinal silhouette is normal in size and configuration.     LUNGS:  Lungs are clear.  There is no evidence of pneumothorax or pleural  effusion.     ABDOMEN:  No remarkable upper abdominal findings.     BONES:  No acute osseous changes.  Impression: No acute cardiopulmonary process.  Signed by Rafa Ellison MD  ECG 12 lead  Atrial fibrillation with rapid  ventricular response  Left axis deviation  Septal infarct , age undetermined  Abnormal ECG  No previous ECGs available      Physical Exam  Constitutional:       Appearance: Normal appearance.   HENT:      Head: Normocephalic and atraumatic.      Mouth/Throat:      Mouth: Mucous membranes are moist.   Cardiovascular:      Rate and Rhythm: Rhythm irregular.      Heart sounds: No murmur heard.     No gallop.   Pulmonary:      Effort: No respiratory distress.      Breath sounds: No rhonchi.   Abdominal:      Palpations: Abdomen is soft.      Tenderness: There is no abdominal tenderness.   Musculoskeletal:         General: Tenderness (Left shoulder) present.      Right lower leg: No edema.      Left lower leg: No edema.      Comments: Left shoulder movement restriction prominently with abduction   Skin:     General: Skin is warm.   Neurological:      Mental Status: He is alert and oriented to person, place, and time.      Cranial Nerves: No cranial nerve deficit.      Motor: No weakness.   Psychiatric:         Mood and Affect: Mood normal.         Relevant Results  Scheduled medications  apixaban, 5 mg, oral, BID  folic acid, 1 mg, oral, Daily  lidocaine, 1 patch, transdermal, Daily  losartan 50 mg, hydroCHLOROthiazide 12.5 mg for Hyzaar 50 mg-12.5 mg, , oral, Daily  metoprolol tartrate, 50 mg, oral, BID  perflutren lipid microspheres, 0.5-10 mL of dilution, intravenous, Once in imaging  perflutren protein A microsphere, 0.5 mL, intravenous, Once in imaging  potassium chloride CR, 40 mEq, oral, Once  sulfur hexafluoride microsphr, 2 mL, intravenous, Once in imaging  [START ON 3/25/2025] thiamine, 100 mg, oral, Daily  thiamine, 100 mg, intravenous, Daily  vitamin B complex-vitamin C-folic acid, 1 capsule, oral, Daily      Continuous medications     PRN medications  PRN medications: acetaminophen, metoprolol, oxyCODONE  Results for orders placed or performed during the hospital encounter of 03/22/25 (from the past 24  hours)   Comprehensive Metabolic Panel   Result Value Ref Range    Glucose 98 74 - 99 mg/dL    Sodium 137 136 - 145 mmol/L    Potassium 3.9 3.5 - 5.3 mmol/L    Chloride 102 98 - 107 mmol/L    Bicarbonate 21 21 - 32 mmol/L    Anion Gap 18 10 - 20 mmol/L    Urea Nitrogen 26 (H) 6 - 23 mg/dL    Creatinine 1.48 (H) 0.50 - 1.30 mg/dL    eGFR 54 (L) >60 mL/min/1.73m*2    Calcium 9.7 8.6 - 10.3 mg/dL    Albumin 4.4 3.4 - 5.0 g/dL    Alkaline Phosphatase 43 33 - 136 U/L    Total Protein 7.0 6.4 - 8.2 g/dL    AST 24 9 - 39 U/L    Bilirubin, Total 1.4 (H) 0.0 - 1.2 mg/dL    ALT 23 10 - 52 U/L   Magnesium   Result Value Ref Range    Magnesium 2.05 1.60 - 2.40 mg/dL   Troponin, High Sensitivity, 1 Hour   Result Value Ref Range    Troponin I, High Sensitivity <3 0 - 20 ng/L   TSH   Result Value Ref Range    Thyroid Stimulating Hormone 1.33 0.44 - 3.98 mIU/L   CBC   Result Value Ref Range    WBC 8.3 4.4 - 11.3 x10*3/uL    nRBC 0.0 0.0 - 0.0 /100 WBCs    RBC 6.09 (H) 4.50 - 5.90 x10*6/uL    Hemoglobin 17.5 13.5 - 17.5 g/dL    Hematocrit 53.7 (H) 41.0 - 52.0 %    MCV 88 80 - 100 fL    MCH 28.7 26.0 - 34.0 pg    MCHC 32.6 32.0 - 36.0 g/dL    RDW 13.0 11.5 - 14.5 %    Platelets 206 150 - 450 x10*3/uL   Basic metabolic panel   Result Value Ref Range    Glucose 94 74 - 99 mg/dL    Sodium 135 (L) 136 - 145 mmol/L    Potassium 3.6 3.5 - 5.3 mmol/L    Chloride 102 98 - 107 mmol/L    Bicarbonate 26 21 - 32 mmol/L    Anion Gap 11 10 - 20 mmol/L    Urea Nitrogen 23 6 - 23 mg/dL    Creatinine 1.13 0.50 - 1.30 mg/dL    eGFR 74 >60 mL/min/1.73m*2    Calcium 9.1 8.6 - 10.3 mg/dL   Magnesium   Result Value Ref Range    Magnesium 1.96 1.60 - 2.40 mg/dL   Lipid panel   Result Value Ref Range    Cholesterol 144 0 - 199 mg/dL    HDL-Cholesterol 39.4 mg/dL    Cholesterol/HDL Ratio 3.7     LDL Calculated 83 <=99 mg/dL    VLDL 21 0 - 40 mg/dL    Triglycerides 107 0 - 149 mg/dL    Non HDL Cholesterol 105 0 - 149 mg/dL   Hepatic function panel   Result  Value Ref Range    Albumin 4.0 3.4 - 5.0 g/dL    Bilirubin, Total 1.1 0.0 - 1.2 mg/dL    Bilirubin, Direct 0.2 0.0 - 0.3 mg/dL    Alkaline Phosphatase 39 33 - 136 U/L    ALT 18 10 - 52 U/L    AST 17 9 - 39 U/L    Total Protein 6.5 6.4 - 8.2 g/dL   Transthoracic Echo (TTE) Complete   Result Value Ref Range    AV pk kalyani 1.09 m/s    LV Biplane EF 48 %    LVOT diam 2.34 cm    Tricuspid annular plane systolic excursion 1.8 cm    LV EF 50 %    RV free wall pk S' 13.00 cm/s    RVSP 22.2 mmHg    LVIDd 4.96 cm    Aortic Valve Area by Continuity of Peak Velocity 3.84 cm2    AV pk grad 5 mmHg    LV A4C EF 53.3      Transthoracic Echo (TTE) Complete    Result Date: 3/23/2025            [facility] [id number] [facility] [address]    Tel [phone] Fax [phone] TRANSTHORACIC ECHOCARDIOGRAM REPORT Patient Name:       TATIANA MIRAMONTES          Reading Physician:    [id number] Tyrone Lin MD Study Date:         3/23/2025            Ordering Provider:    [id number] GENARO DIAS MRN/PID:            [id number]             Fellow: Accession#:         [id number]         Nurse: Date of Birth/Age:  1965 / 59      Sonographer:          Shameka alva                                      [facility] Gender Assigned at  M                    Additional Staff: Birth: Height:             177.80 cm            Admit Date: Weight:             106.59 kg            Admission Status:     Inpatient -                                                                Priority                                                                discharge BSA / BMI:          2.24 m2 / 33.72      Department Location:  [facility] CCU SD                     kg/m2 Blood Pressure: 159 /96 mmHg Study Type:    TRANSTHORACIC ECHO (TTE) COMPLETE Diagnosis/ICD: Unspecified atrial fibrillation-I48.91 Indication:    new onset afib CPT Codes:     Echo Complete w Full Doppler-[id number]  Patient History: Pertinent History: HTN and A-Fib. Study Detail: The following Echo studies were performed: 2D, M-Mode and Doppler.  PHYSICIAN INTERPRETATION: Left Ventricle: The left ventricular systolic function is mildly decreased, with a visually estimated ejection fraction of 50%. The patient is in atrial fibrillation which may influence the estimate of left ventricular function and transvalvular flows. There is mild concentric left ventricular hypertrophy. There are no regional wall motion abnormalities. The left ventricular cavity size is normal. There is mild increased septal and mildly increased posterior left ventricular wall thickness. There is left ventricular concentric remodeling. Left ventricular diastolic filling was indeterminate due to atrial fibrillation/flutter. Left Atrium: The left atrial size is moderately dilated. Right Ventricle: The right ventricle is normal in size. There is normal right ventricular global systolic function. Right Atrium: The right atrial size is mildly dilated. Aortic Valve: The aortic valve is trileaflet. There is no evidence of aortic valve regurgitation. The peak instantaneous gradient of the aortic valve is 5 mmHg. Mitral Valve: The mitral valve is normal in structure. There is trace mitral valve regurgitation. Tricuspid Valve: The tricuspid valve is structurally normal. There is trace tricuspid regurgitation. The Doppler estimated RVSP is within normal limits at 22.2 mmHg. Pulmonic Valve: The pulmonic valve is structurally normal. There is no indication of pulmonic valve regurgitation. Pericardium: No pericardial effusion noted. Aorta: The aortic root is normal. In comparison to the previous echocardiogram(s): There are no prior studies on this patient for comparison purposes.  CONCLUSIONS:  1. The left ventricular systolic function is mildly decreased, with a visually estimated ejection fraction of 50%.  2. Left ventricular diastolic filling was indeterminate due to  atrial fibrillation/flutter.  3. The left atrial size is moderately dilated.  4. Right ventricular systolic pressure is within normal limits.  5. The patient is in atrial fibrillation which may influence the estimate of left ventricular function and transvalvular flows. QUANTITATIVE DATA SUMMARY:  2D MEASUREMENTS:             Normal Ranges: LAs:             4.52 cm     (2.7-4.0cm) IVSd:            1.45 cm     (0.6-1.1cm) LVPWd:           1.29 cm     (0.6-1.1cm) LVIDd:           4.96 cm     (3.9-5.9cm) LVIDs:           4.13 cm LV Mass Index:   124.4 g/m2 LVEDV Index:     38.00 ml/m2 LV % FS          16.6 %  LEFT ATRIUM:                 Normal Ranges: LA Area A4C:      25.0 cm2 LA Area A2C:      26.0 cm2 LA Volume Index:  41.0 ml/m2 LA Vol A4C:       77.3 ml LA Vol A2C:       88.4 ml LA Vol Index BSA: 37.1 ml/m2  M-MODE MEASUREMENTS:         Normal Ranges: Ao Root:             3.80 cm (2.0-3.7cm) AoV Exc:             2.46 cm (1.5-2.5cm)  AORTA MEASUREMENTS:         Normal Ranges: AoV Exc:            2.46 cm (1.5-2.5cm) Ao Sinus, d:        4.40 cm (2.1-3.5cm) Ao STJ, d:          3.20 cm (1.7-3.4cm) Asc Ao, d:          3.90 cm (2.1-3.4cm)  LV SYSTOLIC FUNCTION:                      Normal Ranges: EF-A4C View:    53 % (>=55%) EF-A2C View:    40 % EF-Biplane:     48 % EF-Visual:      50 % LV EF Reported: 50 %  LV DIASTOLIC FUNCTION:           Normal Ranges: MV Peak E:             0.91 m/s  (0.7-1.2 m/s) MV e'                  0.132 m/s (>8.0) MV lateral e'          0.16 m/s MV medial e'           0.11 m/s E/e' Ratio:            6.87      (<8.0)  MITRAL VALVE:          Normal Ranges: MV DT:        135 msec (150-240msec)  AORTIC VALVE:           Normal Ranges: AoV Vmax:      1.09 m/s (<=1.7m/s) AoV Peak P.7 mmHg (<20mmHg) LVOT Max Doug:  0.98 m/s (<=1.1m/s) LVOT VTI:      17.41 cm LVOT Diameter: 2.34 cm  (1.8-2.4cm) AoV Area,Vmax: 3.84 cm2 (2.5-4.5cm2)  RIGHT VENTRICLE: RV Basal 3.80 cm RV Mid   1.90 cm RV Major 7.8 cm  TAPSE:   18.0 mm RV s'    0.13 m/s  TRICUSPID VALVE/RVSP:          Normal Ranges: Peak TR Velocity:     2.19 m/s RV Syst Pressure:     22 mmHg  (< 30mmHg) IVC Diam:             2.17 cm  PULMONIC VALVE:         Normal Ranges: PV Accel Time:  71 msec (>120ms)  AORTA: Asc Ao Diam 3.88 cm  [id number] Tyrone Lin MD Electronically signed on 3/23/2025 at 1:00:08 PM  ** Final **     XR shoulder left 2+ views    Result Date: 3/22/2025  Interpreted By:  Marty Wong, STUDY: XR SHOULDER LEFT 2+ VIEWS; ;  3/22/2025 7:43 pm   INDICATION: Shoulder pain.   COMPARISON: None.   ACCESSION NUMBER(S): [id number]   ORDERING CLINICIAN: GENARO DIAS   FINDINGS: No acute fracture or dislocation of the left shoulder. There is left shoulder osteoarthrosis. There is glenohumeral osteoarthrosis with osseous spurring at the humeral head.       No acute fracture or dislocation of the left shoulder.   Left shoulder osteoarthrosis.   MACRO: None   Signed by: Marty Wong 3/22/2025 8:14 PM Dictation workstation:   [id number]    CT angio chest w and wo IV contrast    Result Date: 3/22/2025  STUDY: CT Angiogram of the Chest; 03/22/2025 3:55 PM INDICATION: Assess for dissection with worsening pain in bilateral arms despite normal troponin and EKG in setting of new atrial fibrillation with .4. COMPARISON: None. ACCESSION NUMBER(S): [id number] ORDERING CLINICIAN: GENARO DIAS TECHNIQUE:  CTA of the chest was performed without and with intravenous contrast.  Images are reviewed and processed at a workstation according to the CT angiogram protocol with 3-D and/or MIP post processing imaging generated.  90 mL Omnipaque-350 was administered intravenously.  1726 DICOM images received. Automated mA/kV exposure control was utilized and patient examination was performed in strict accordance with principles of ALARA. FINDINGS: Pulmonary arteries are adequately opacified without acute or chronic filling defects.  The thoracic aorta is normal in course  and caliber without dissection or aneurysm.  Coronary artery calcifications are present. The heart is normal in size without pericardial effusion.  Thoracic lymph nodes are not enlarged. There is no pleural effusion, pleural thickening, or pneumothorax. The airways are patent. Lungs are clear without consolidation, interstitial disease, or suspicious nodules. Upper abdomen demonstrates no acute pathology. There are no acute fractures.  No suspicious bony lesions.    1.  No evidence for thoracic aortic dissection or aneurysmal dilatation. 2.  No evidence for pulmonary embolus or acute lung parenchymal process. 3.  Coronary artery calcifications. Signed by Rafa Ellison MD    XR chest 1 view    Result Date: 3/22/2025  STUDY: Chest Radiograph;  3/22/2025 at 1:54 PM. INDICATION: Chest pain. COMPARISON: None available. ACCESSION NUMBER(S): [id number] ORDERING CLINICIAN: GENARO DIAS TECHNIQUE:  Frontal chest was obtained at 13:54 hours (two images). FINDINGS: CARDIOMEDIASTINAL SILHOUETTE: Cardiomediastinal silhouette is normal in size and configuration.  LUNGS: Lungs are clear.  There is no evidence of pneumothorax or pleural effusion.  ABDOMEN: No remarkable upper abdominal findings.  BONES: No acute osseous changes.    No acute cardiopulmonary process. Signed by Rafa Ellison MD    ECG 12 lead    Result Date: 3/22/2025  Atrial fibrillation with rapid ventricular response Left axis deviation Septal infarct , age undetermined Abnormal ECG No previous ECGs available         Assessment/Plan   59-year-old male with PMHx of hypertension and untreated BOSSMAN.  He presented to ED with left shoulder pain 4-5/10 radiating to neck, pain associated with diaphoresis, dizziness and blurred vision.  Patient denies chest pain and shortness of breath.  Patient reports he drinks alcohol 3-4 times per week about 3-4 liquor per day.  Last drink yesterday night around 7:30 PM about 4-5 cocktails.  In ED he was afebrile , BP  140/86, RR 16, SaO2 94% in room air.  CMP remarkable for creatinine 1.48 [baseline 1.1].,  CBC remarkable for leukocytosis 13.2, Hgb 18.7.  EKG reveals atrial fibrillation with RVR , no ischemic changes, QTc 469.  Chest x-ray unremarkable.  CT angio chest rule out pulmonary embolism and aortic dissection.  Patient received metoprolol and Ativan which bring heart rate down.     #New onset atrial fibrillation with RVR (REINIER-VASCs 1)  #Heavy alcohol drinking  #Hypertension  #Untreated BOSSMAN  Patient came with left shoulder pain noted with diaphoresis, dizziness, blurry vision.  -Patient drinks alcohol 3-4 times per week about 3-4 liquor per day.    -Last drink yesterday 7:30 PM 4-5 drinks  EKG showed A-fib with  RVR ,, no ischemic changes.  Chest x-ray unremarkable, CT angiogram rule out aortic dissection, PE  -Echocardiogram 3/23 reveals EF 50%, left atrial size moderately dilated, right ventricular systolic pressure within normal limits.  -Cardiology recommended starting Eliquis, continue with metoprolol AV converted to NSR we can discharge home on Eliquis and metoprolol.  If not they will do [facility] tomorrow morning.  Discharged home with statin for coronary calcification,     Plan:  -Telemetry bed  -Started statin for chronic calcification  -Increase metoprolol dose to 50 mg twice daily  -Risk stratification: Lipid panel, TSH, Hgb A1c  -Switch anticoagulant to Eliquis 5 mg twice daily  -Patient is calm now we will hold on CIWA protocol for now. Will activate CIWA if patient started agitated.        #Left shoulder pain  -more with  abduction. Mild tenderness in deltoid muscle. No weakness  -Left shoulder x-ray shows osteoarthritis, no fracture or displacement.  Plan:  - pain meds: Tylenol for mild pain, oxycodone for moderate and severe pain, lidocaine patch     #Acute kidney injury--- resolved  Most likely prerenal due to dehydration and low effective blood flow to kidney in setting of fast heart rate.    -Creatinine today down trended to 1.13 from 1.48 yesterday  -Will resume losartan/hydrochlorothiazide     #Mild leukocytosis  On admission WBC 13.2 most likely due to stress.  Hgb 18.7 most likely due to untreated BOSSMAN or may be due to dehydration.  -Today WBC 8.3        Hgb 17.5.  -Will continue monitor CBC     Johnathan Gonzalez MD  Internal medicine, PGY1

## 2025-03-23 NOTE — PROGRESS NOTES
Spiritual Care Visit  Spiritual Care Request    Reason for Visit:  Routine Visit: Introduction  Continue Visiting: No     Request Received From:       Focus of Care:  Visited With: Patient and family together         Refer to :          Spiritual Care Assessment    Spiritual Assessment:                      Care Provided:  Intended Effects: Establish rapport and connectedness, Vidya affirmation, Build relationship of care and support, Demonstrate caring and concern    Sense of Community and or Pentec[employer]al Affiliation:  [employer]         Addressed Needs/Concerns and/or Compa Through:          Outcome:  Outcome of Spiritual Care Visit: Unknown     Advance Directives:         Spiritual Care Annotation    Annotation:

## 2025-03-23 NOTE — SIGNIFICANT EVENT
"Patient is resting comfortably. His wife is at the bedside. Monitor shows AF with controlled ventricular rate. He has complaints of left shoulder pain with movement only. He describes the discomfort as a \"tooth ache\" type pain. The patient rates the pain 6/10 when it occurs with movement of the arm. He has no pain if he does not move his arm. The patient declined offered pain medication.  "

## 2025-03-23 NOTE — SIGNIFICANT EVENT
Patient reports he has not been sleeping well due to pain in the left shoulder every time he moves his arm. He has to  his left arm with his right hand to move it. He has no pain if the arm is still. Patient medicated for pain. See MAR. Also Sepsis alert with the current Vital signs. Teaching notified although Sepsis is not suspected. The patient is in pain. No intervention ordered by; Resident.

## 2025-03-23 NOTE — CARE PLAN
The patient's goals for the shift include rest    The clinical goals for the shift include decrease in pain    Problem: Pain - Adult  Goal: Verbalizes/displays adequate comfort level or baseline comfort level  Outcome: Progressing     Problem: Safety - Adult  Goal: Free from fall injury  Outcome: Progressing     Problem: Discharge Planning  Goal: Discharge to home or other facility with appropriate resources  Outcome: Progressing     Problem: Chronic Conditions and Co-morbidities  Goal: Patient's chronic conditions and co-morbidity symptoms are monitored and maintained or improved  Outcome: Progressing     Problem: Nutrition  Goal: Nutrient intake appropriate for maintaining nutritional needs  Outcome: Progressing     Problem: Arrythmia/Dysrhythmia  Goal: Lab values return to normal range  Outcome: Progressing  Goal: No evidence of post procedure complications  Outcome: Progressing  Goal: Promote self management  Outcome: Progressing  Goal: Serial ECG will return to baseline  Outcome: Progressing  Goal: Verbalize understanding of procedures/devices  Outcome: Progressing  Goal: Vital signs return to baseline  Outcome: Progressing  Goal: Care and maintenance of device (specify)  Outcome: Progressing     Problem: Pain  Goal: Takes deep breaths with improved pain control throughout the shift  Outcome: Progressing  Goal: Turns in bed with improved pain control throughout the shift  Outcome: Progressing  Goal: Walks with improved pain control throughout the shift  Outcome: Progressing  Goal: Performs ADL's with improved pain control throughout shift  Outcome: Progressing  Goal: Participates in PT with improved pain control throughout the shift  Outcome: Progressing  Goal: Free from opioid side effects throughout the shift  Outcome: Progressing  Goal: Free from acute confusion related to pain meds throughout the shift  Outcome: Progressing

## 2025-03-23 NOTE — CONSULTS
[facility] Cardiology In-patient Consult Note    Consulting Cardiologist: Tyrone Lin MD  Primary Cardiologist:    Reason for Consult: Atrial fibrillation     Assessment/Plan:    Persistent atrial fibrillation: Unknown onset.  Patient cannot tell he is in atrial fibrillation.  We cannot safely give him flecainide to try and convert him to sinus rhythm.  Continue with metoprolol for rate control.  Very good chance he will spontaneously convert to sinus rhythm.  If he does later this afternoon he could be discharged home with 30 days of oral anticoagulation and then switch to aspirin since his chads 2 vas score is only 1.  If he does not spontaneously convert we will proceed with transesophageal echocardiogram and cardioversion in AM.  2.  Coronary artery calcification: Patient has no anginal symptoms.  Start statin therapy.  Aspirin indefinitely.  No need for stress testing.        History Of Present Illness:    Wyatt Malave is a 59 y.o. male presenting with left shoulder pain and was found to be in atrial fibrillation with rapid ventricular response.  He cannot tell he is in atrial fibrillation.  He has no chest discomfort.  He has a history of hypertension.  CTA of the chest was negative for pulmonary embolism but did show some coronary calcification but it was not quantified.       Past Medical History:  He has a past medical history of Hypertension.    Past Surgical History:  He has no past surgical history on file.    Problem List:  Patient Active Problem List   Diagnosis    HTN (hypertension)    Encounter to establish care with new doctor    BMI 34.0-34.9,adult    Class 1 obesity due to excess calories without serious comorbidity with body mass index (BMI) of 34.0 to 34.9 in adult    Atrial fibrillation with RVR (Multi)       Social History:  He reports that he has never smoked. He has never used smokeless tobacco. He reports that he does not currently use alcohol after a past usage of about 16.0  standard drinks of alcohol per week. He reports that he does not use drugs.    Family History:  Family History   Problem Relation Name Age of Onset    Diabetes Mother      Cancer Father      Diabetes Sister      Diabetes Brother          Allergies:  Patient has no known allergies.    Inpatient Medications:  Scheduled medications   Medication Dose Route Frequency    enoxaparin  1 mg/kg subcutaneous q12h    folic acid  1 mg oral Daily    lidocaine  1 patch transdermal Daily    losartan 50 mg, hydroCHLOROthiazide 12.5 mg for Hyzaar 50 mg-12.5 mg   oral Daily    metoprolol tartrate  50 mg oral BID    perflutren lipid microspheres  0.5-10 mL of dilution intravenous Once in imaging    perflutren protein A microsphere  0.5 mL intravenous Once in imaging    potassium chloride CR  40 mEq oral Once    sulfur hexafluoride microsphr  2 mL intravenous Once in imaging    [START ON 3/25/2025] thiamine  100 mg oral Daily    thiamine  100 mg intravenous Daily    vitamin B complex-vitamin C-folic acid  1 capsule oral Daily     PRN medications   Medication    acetaminophen    metoprolol    oxyCODONE     Continuous Medications   Medication Dose Last Rate       Outpatient Medications:  Current Outpatient Medications   Medication Instructions    losartan-hydrochlorothiazide (Hyzaar) 50-12.5 mg tablet 1 tablet, oral, Daily    sildenafil (VIAGRA) 100 mg, oral, Daily PRN    tirzepatide (weight loss) 5 mg, subcutaneous, Every 7 days       Last Recorded Vitals:  Vitals:    03/23/25 0800 03/23/25 0904 03/23/25 1200 03/23/25 1253   BP:  (!) 151/100  139/90   BP Location:  Right arm  Right arm   Patient Position:  Lying  Lying   Pulse: 90 94 92 84   Resp: (!) 28 24  20   Temp:  35.4 °C (95.7 °F)  35.6 °C (96.1 °F)   TempSrc:  Temporal  Temporal   SpO2:       Weight:       Height:         Last I/O:  I/O last 3 completed shifts:  In: 1050 (9.8 mL/kg) [I.V.:50 (0.5 mL/kg); IV Piggyback:1000]  Out: 375 (3.5 mL/kg) [Urine:375 (0.1 mL/kg/hr)]  Weight:  107 kg     Physical Exam  Vitals reviewed.   Constitutional:       Appearance: Normal appearance.   Eyes:      Pupils: Pupils are equal, round, and reactive to light.   Neck:      Vascular: No JVD.   Cardiovascular:      Rate and Rhythm: Normal rate. Rhythm irregularly irregular.      Pulses: Normal pulses.      Heart sounds: No murmur heard.     No gallop.   Pulmonary:      Effort: No respiratory distress.      Breath sounds: No wheezing or rales.   Abdominal:      General: Abdomen is flat. There is no distension.      Palpations: Abdomen is soft.   Musculoskeletal:         General: No swelling.      Right lower leg: No edema.      Left lower leg: No edema.   Neurological:      General: No focal deficit present.      Mental Status: He is alert.   Psychiatric:         Mood and Affect: Mood normal.            Last Labs:  CBC - 3/23/2025:  5:02 AM  8.3 17.5 206    53.7      CMP - 3/23/2025:  5:02 AM  9.1 6.5 17 --- 1.1   _ 4.0 18 39      Troponin I, High Sensitivity   Date/Time Value Ref Range Status   03/22/2025 01:46 PM <3 0 - 20 ng/L Final   03/22/2025 12:54 PM 3 0 - 20 ng/L Final     BNP   Date/Time Value Ref Range Status   03/22/2025 12:54 PM 56 0 - 99 pg/mL Final     Hemoglobin A1C   Date/Time Value Ref Range Status   03/22/2025 12:54 PM 5.4 See comment % Final   07/17/2024 03:00 PM 5.8 (H) see below % Final     LDL Calculated   Date/Time Value Ref Range Status   03/23/2025 05:02 AM 83 <=99 mg/dL Final     Comment:                                 Near   Borderline      AGE      Desirable  Optimal    High     High     Very High     0-19 Y     0 - 109     ---    110-129   >/= 130     ----    20-24 Y     0 - 119     ---    120-159   >/= 160     ----      >24 Y     0 -  99   100-129  130-159   160-189     >/=190     07/17/2024 03:00  (H) <=99 mg/dL Final     Comment:                                 Near   Borderline      AGE      Desirable  Optimal    High     High     Very High     0-19 Y     0 - 109     ---     110-129   >/= 130     ----    20-24 Y     0 - 119     ---    120-159   >/= 160     ----      >24 Y     0 -  99   100-129  130-159   160-189     >/=190       VLDL   Date/Time Value Ref Range Status   03/23/2025 05:02 AM 21 0 - 40 mg/dL Final   07/17/2024 03:00 PM 51 (H) 0 - 40 mg/dL Final       EKG:   Encounter Date: 03/22/25   ECG 12 lead   Result Value    Ventricular Rate 155    Atrial Rate 174    QRS Duration 80    QT Interval 292    QTC Calculation(Bazett) 469    R Axis -66    T Axis 63    QRS Count 26    Q Onset 218    T Offset 364    QTC Fredericia 400    Narrative    Atrial fibrillation with rapid ventricular response  Left axis deviation  Septal infarct , age undetermined  Abnormal ECG  No previous ECGs available     ECHO: Results for orders placed during the hospital encounter of 03/22/25    Transthoracic Echo (TTE) Complete    Narrative  [facility] [place]  [id number] [facility] [address]  Tel [phone] Fax [phone]    TRANSTHORACIC ECHOCARDIOGRAM REPORT    Patient Name:       TATIANA Vivas Physician:    [id number] Tyrone Lin MD  Study Date:         3/23/2025            Ordering Provider:    [id number] GENARO DIAS  MRN/PID:            [id number]             Fellow:  Accession#:         [id number]         Nurse:  Date of Birth/Age:  1965 / 59      Sonographer:          Shameka alva                                      [facility]  Gender Assigned at  M                    Additional Staff:  Birth:  Height:             177.80 cm            Admit Date:  Weight:             106.59 kg            Admission Status:     Inpatient -  Priority  discharge  BSA / BMI:          2.24 m2 / 33.72      Department Location:  [facility] CCU SD  kg/m2  Blood Pressure: 159 /96 mmHg    Study Type:    TRANSTHORACIC ECHO (TTE) COMPLETE  Diagnosis/ICD: Unspecified atrial fibrillation-I48.91  Indication:    new onset afib  CPT Codes:     Echo Complete w Full Doppler-[id number]    Patient History:  Pertinent  History: HTN and A-Fib.    Study Detail: The following Echo studies were performed: 2D, M-Mode and Doppler.      PHYSICIAN INTERPRETATION:  Left Ventricle: The left ventricular systolic function is mildly decreased, with a visually estimated ejection fraction of 50%. The patient is in atrial fibrillation which may influence the estimate of left ventricular function and transvalvular flows. There is mild concentric left ventricular hypertrophy. There are no regional wall motion abnormalities. The left ventricular cavity size is normal. There is mild increased septal and mildly increased posterior left ventricular wall thickness. There is left ventricular concentric remodeling. Left ventricular diastolic filling was indeterminate due to atrial fibrillation/flutter.  Left Atrium: The left atrial size is moderately dilated.  Right Ventricle: The right ventricle is normal in size. There is normal right ventricular global systolic function.  Right Atrium: The right atrial size is mildly dilated.  Aortic Valve: The aortic valve is trileaflet. There is no evidence of aortic valve regurgitation. The peak instantaneous gradient of the aortic valve is 5 mmHg.  Mitral Valve: The mitral valve is normal in structure. There is trace mitral valve regurgitation.  Tricuspid Valve: The tricuspid valve is structurally normal. There is trace tricuspid regurgitation. The Doppler estimated RVSP is within normal limits at 22.2 mmHg.  Pulmonic Valve: The pulmonic valve is structurally normal. There is no indication of pulmonic valve regurgitation.  Pericardium: No pericardial effusion noted.  Aorta: The aortic root is normal.  In comparison to the previous echocardiogram(s): There are no prior studies on this patient for comparison purposes.      CONCLUSIONS:  1. The left ventricular systolic function is mildly decreased, with a visually estimated ejection fraction of 50%.  2. Left ventricular diastolic filling was indeterminate due to  atrial fibrillation/flutter.  3. The left atrial size is moderately dilated.  4. Right ventricular systolic pressure is within normal limits.  5. The patient is in atrial fibrillation which may influence the estimate of left ventricular function and transvalvular flows.    QUANTITATIVE DATA SUMMARY:    2D MEASUREMENTS:             Normal Ranges:  LAs:             4.52 cm     (2.7-4.0cm)  IVSd:            1.45 cm     (0.6-1.1cm)  LVPWd:           1.29 cm     (0.6-1.1cm)  LVIDd:           4.96 cm     (3.9-5.9cm)  LVIDs:           4.13 cm  LV Mass Index:   124.4 g/m2  LVEDV Index:     38.00 ml/m2  LV % FS          16.6 %      LEFT ATRIUM:                 Normal Ranges:  LA Area A4C:      25.0 cm2  LA Area A2C:      26.0 cm2  LA Volume Index:  41.0 ml/m2  LA Vol A4C:       77.3 ml  LA Vol A2C:       88.4 ml  LA Vol Index BSA: 37.1 ml/m2      M-MODE MEASUREMENTS:         Normal Ranges:  Ao Root:             3.80 cm (2.0-3.7cm)  AoV Exc:             2.46 cm (1.5-2.5cm)      AORTA MEASUREMENTS:         Normal Ranges:  AoV Exc:            2.46 cm (1.5-2.5cm)  Ao Sinus, d:        4.40 cm (2.1-3.5cm)  Ao STJ, d:          3.20 cm (1.7-3.4cm)  Asc Ao, d:          3.90 cm (2.1-3.4cm)      LV SYSTOLIC FUNCTION:  Normal Ranges:  EF-A4C View:    53 % (>=55%)  EF-A2C View:    40 %  EF-Biplane:     48 %  EF-Visual:      50 %  LV EF Reported: 50 %      LV DIASTOLIC FUNCTION:           Normal Ranges:  MV Peak E:             0.91 m/s  (0.7-1.2 m/s)  MV e'                  0.132 m/s (>8.0)  MV lateral e'          0.16 m/s  MV medial e'           0.11 m/s  E/e' Ratio:            6.87      (<8.0)      MITRAL VALVE:          Normal Ranges:  MV DT:        135 msec (150-240msec)      AORTIC VALVE:           Normal Ranges:  AoV Vmax:      1.09 m/s (<=1.7m/s)  AoV Peak P.7 mmHg (<20mmHg)  LVOT Max Doug:  0.98 m/s (<=1.1m/s)  LVOT VTI:      17.41 cm  LVOT Diameter: 2.34 cm  (1.8-2.4cm)  AoV Area,Vmax: 3.84 cm2 (2.5-4.5cm2)      RIGHT  show VENTRICLE:  RV Basal 3.80 cm  RV Mid   1.90 cm  RV Major 7.8 cm  TAPSE:   18.0 mm  RV s'    0.13 m/s      TRICUSPID VALVE/RVSP:          Normal Ranges:  Peak TR Velocity:     2.19 m/s  RV Syst Pressure:     22 mmHg  (< 30mmHg)  IVC Diam:             2.17 cm      PULMONIC VALVE:         Normal Ranges:  PV Accel Time:  71 msec (>120ms)      AORTA:  Asc Ao Diam 3.88 cm      [id number] Tyrone Lin MD  Electronically signed on 3/23/2025 at 1:00:08 PM        ** Final **     CT Results:  CT angio chest w and wo IV contrast 03/22/2025    Narrative  STUDY:  CT Angiogram of the Chest; 03/22/2025 3:55 PM  INDICATION:  Assess for dissection with worsening pain in bilateral arms despite  normal troponin and EKG in setting of new atrial fibrillation with RVR  185.4.  COMPARISON:  None.  ACCESSION NUMBER(S):  [id number]  ORDERING CLINICIAN:  GENARO DIAS  TECHNIQUE:  CTA of the chest was performed without and with  intravenous contrast.  Images are reviewed and processed at a  workstation according to the CT angiogram protocol with 3-D and/or MIP  post processing imaging generated.  90 mL Omnipaque-350 was  administered intravenously.  1726 DICOM images received.  Automated mA/kV exposure control was utilized and patient examination  was performed in strict accordance with principles of ALARA.  FINDINGS:  Pulmonary arteries are adequately opacified without acute or chronic  filling defects.  The thoracic aorta is normal in course and caliber  without dissection or aneurysm.  Coronary artery calcifications are  present.  The heart is normal in size without pericardial effusion.  Thoracic  lymph nodes are not enlarged.  There is no pleural effusion, pleural thickening, or pneumothorax.  The airways are patent.  Lungs are clear without consolidation, interstitial disease, or  suspicious nodules.  Upper abdomen demonstrates no acute pathology.  There are no acute fractures.  No suspicious bony lesions.    Impression  1.  No evidence for  thoracic aortic dissection or aneurysmal  dilatation.  2.  No evidence for pulmonary embolus or acute lung parenchymal  process.  3.  Coronary artery calcifications.  Signed by MD Tyrone Leslie MD

## 2025-03-23 NOTE — CARE PLAN
The patient's goals for the shift include reduced pain    The clinical goals for the shift include Patient will return to NSR by the end of shift    Pt is NPO at midnight for KAYLI cardioversion tomorrow morning if he does not convert by then.

## 2025-03-24 ENCOUNTER — APPOINTMENT (OUTPATIENT)
Dept: CARDIOLOGY | Facility: HOSPITAL | Age: 60
DRG: 309 | End: 2025-03-24
Payer: MEDICARE

## 2025-03-24 ENCOUNTER — ANESTHESIA EVENT (OUTPATIENT)
Dept: CARDIOLOGY | Facility: HOSPITAL | Age: 60
DRG: 309 | End: 2025-03-24
Payer: MEDICARE

## 2025-03-24 ENCOUNTER — ANESTHESIA (OUTPATIENT)
Dept: CARDIOLOGY | Facility: HOSPITAL | Age: 60
DRG: 309 | End: 2025-03-24
Payer: MEDICARE

## 2025-03-24 ENCOUNTER — PHARMACY VISIT (OUTPATIENT)
Dept: PHARMACY | Facility: CLINIC | Age: 60
End: 2025-03-24
Payer: COMMERCIAL

## 2025-03-24 ENCOUNTER — TELEPHONE (OUTPATIENT)
Dept: CARDIOLOGY | Facility: HOSPITAL | Age: 60
End: 2025-03-24

## 2025-03-24 VITALS
TEMPERATURE: 97 F | DIASTOLIC BLOOD PRESSURE: 94 MMHG | RESPIRATION RATE: 20 BRPM | HEART RATE: 98 BPM | WEIGHT: 235.89 LBS | BODY MASS INDEX: 31.26 KG/M2 | HEIGHT: 73 IN | SYSTOLIC BLOOD PRESSURE: 135 MMHG | OXYGEN SATURATION: 95 %

## 2025-03-24 VITALS
HEART RATE: 62 BPM | SYSTOLIC BLOOD PRESSURE: 134 MMHG | BODY MASS INDEX: 31.26 KG/M2 | HEIGHT: 73 IN | DIASTOLIC BLOOD PRESSURE: 80 MMHG | OXYGEN SATURATION: 98 % | WEIGHT: 235.89 LBS | RESPIRATION RATE: 20 BRPM | TEMPERATURE: 97 F

## 2025-03-24 PROBLEM — I48.91 ATRIAL FIBRILLATION WITH RVR (MULTI): Status: RESOLVED | Noted: 2025-03-22 | Resolved: 2025-03-24

## 2025-03-24 LAB
AMPHETAMINES UR QL SCN: ABNORMAL
ANION GAP SERPL CALC-SCNC: 13 MMOL/L (ref 10–20)
BARBITURATES UR QL SCN: ABNORMAL
BENZODIAZ UR QL SCN: ABNORMAL
BUN SERPL-MCNC: 18 MG/DL (ref 6–23)
BZE UR QL SCN: ABNORMAL
CALCIUM SERPL-MCNC: 9.1 MG/DL (ref 8.6–10.3)
CANNABINOIDS UR QL SCN: ABNORMAL
CHLORIDE SERPL-SCNC: 100 MMOL/L (ref 98–107)
CO2 SERPL-SCNC: 26 MMOL/L (ref 21–32)
CREAT SERPL-MCNC: 0.94 MG/DL (ref 0.5–1.3)
EGFRCR SERPLBLD CKD-EPI 2021: >90 ML/MIN/1.73M*2
EJECTION FRACTION: 58 %
ERYTHROCYTE [DISTWIDTH] IN BLOOD BY AUTOMATED COUNT: 12.9 % (ref 11.5–14.5)
FENTANYL+NORFENTANYL UR QL SCN: ABNORMAL
GLUCOSE SERPL-MCNC: 90 MG/DL (ref 74–99)
HCT VFR BLD AUTO: 53 % (ref 41–52)
HGB BLD-MCNC: 17.6 G/DL (ref 13.5–17.5)
MAGNESIUM SERPL-MCNC: 1.89 MG/DL (ref 1.6–2.4)
MCH RBC QN AUTO: 29 PG (ref 26–34)
MCHC RBC AUTO-ENTMCNC: 33.2 G/DL (ref 32–36)
MCV RBC AUTO: 87 FL (ref 80–100)
METHADONE UR QL SCN: ABNORMAL
NRBC BLD-RTO: 0 /100 WBCS (ref 0–0)
OPIATES UR QL SCN: ABNORMAL
OXYCODONE+OXYMORPHONE UR QL SCN: ABNORMAL
PCP UR QL SCN: ABNORMAL
PLATELET # BLD AUTO: 213 X10*3/UL (ref 150–450)
POTASSIUM SERPL-SCNC: 3.9 MMOL/L (ref 3.5–5.3)
Q ONSET: 216 MS
QRS COUNT: 16 BEATS
QRS DURATION: 84 MS
QT INTERVAL: 340 MS
QTC CALCULATION(BAZETT): 445 MS
QTC FREDERICIA: 407 MS
R AXIS: -42 DEGREES
RBC # BLD AUTO: 6.07 X10*6/UL (ref 4.5–5.9)
SODIUM SERPL-SCNC: 135 MMOL/L (ref 136–145)
T AXIS: 29 DEGREES
T OFFSET: 386 MS
VENTRICULAR RATE: 103 BPM
WBC # BLD AUTO: 8 X10*3/UL (ref 4.4–11.3)

## 2025-03-24 PROCEDURE — 99232 SBSQ HOSP IP/OBS MODERATE 35: CPT | Performed by: STUDENT IN AN ORGANIZED HEALTH CARE EDUCATION/TRAINING PROGRAM

## 2025-03-24 PROCEDURE — 93320 DOPPLER ECHO COMPLETE: CPT

## 2025-03-24 PROCEDURE — 3700000001 HC GENERAL ANESTHESIA TIME - INITIAL BASE CHARGE

## 2025-03-24 PROCEDURE — 36415 COLL VENOUS BLD VENIPUNCTURE: CPT

## 2025-03-24 PROCEDURE — 93005 ELECTROCARDIOGRAM TRACING: CPT

## 2025-03-24 PROCEDURE — 2500000004 HC RX 250 GENERAL PHARMACY W/ HCPCS (ALT 636 FOR OP/ED)

## 2025-03-24 PROCEDURE — 7100000010 HC PHASE TWO TIME - EACH INCREMENTAL 1 MINUTE

## 2025-03-24 PROCEDURE — RXMED WILLOW AMBULATORY MEDICATION CHARGE

## 2025-03-24 PROCEDURE — 2500000001 HC RX 250 WO HCPCS SELF ADMINISTERED DRUGS (ALT 637 FOR MEDICARE OP)

## 2025-03-24 PROCEDURE — A93312 PR ECHO HEART,TRANSESOPHAGEAL,COMPLETE: Performed by: ANESTHESIOLOGIST ASSISTANT

## 2025-03-24 PROCEDURE — 80349 CANNABINOIDS NATURAL: CPT

## 2025-03-24 PROCEDURE — 83735 ASSAY OF MAGNESIUM: CPT

## 2025-03-24 PROCEDURE — 80307 DRUG TEST PRSMV CHEM ANLYZR: CPT

## 2025-03-24 PROCEDURE — 2500000004 HC RX 250 GENERAL PHARMACY W/ HCPCS (ALT 636 FOR OP/ED): Performed by: ANESTHESIOLOGIST ASSISTANT

## 2025-03-24 PROCEDURE — 3700000002 HC GENERAL ANESTHESIA TIME - EACH INCREMENTAL 1 MINUTE

## 2025-03-24 PROCEDURE — 7100000009 HC PHASE TWO TIME - INITIAL BASE CHARGE

## 2025-03-24 PROCEDURE — 93312 ECHO TRANSESOPHAGEAL: CPT | Performed by: INTERNAL MEDICINE

## 2025-03-24 PROCEDURE — 99152 MOD SED SAME PHYS/QHP 5/>YRS: CPT | Performed by: INTERNAL MEDICINE

## 2025-03-24 PROCEDURE — 2500000005 HC RX 250 GENERAL PHARMACY W/O HCPCS

## 2025-03-24 PROCEDURE — 2500000001 HC RX 250 WO HCPCS SELF ADMINISTERED DRUGS (ALT 637 FOR MEDICARE OP): Performed by: INTERNAL MEDICINE

## 2025-03-24 PROCEDURE — 85027 COMPLETE CBC AUTOMATED: CPT

## 2025-03-24 PROCEDURE — A93312 PR ECHO HEART,TRANSESOPHAGEAL,COMPLETE: Performed by: STUDENT IN AN ORGANIZED HEALTH CARE EDUCATION/TRAINING PROGRAM

## 2025-03-24 PROCEDURE — 2500000005 HC RX 250 GENERAL PHARMACY W/O HCPCS: Performed by: INTERNAL MEDICINE

## 2025-03-24 PROCEDURE — 3700000012 HC SEDATION LEVEL 5+ TIME - INITIAL 15 MINUTES 5/> YEARS

## 2025-03-24 PROCEDURE — 92960 CARDIOVERSION ELECTRIC EXT: CPT

## 2025-03-24 PROCEDURE — 80365 DRUG SCREENING OXYCODONE: CPT | Mod: STJLAB

## 2025-03-24 PROCEDURE — 80048 BASIC METABOLIC PNL TOTAL CA: CPT

## 2025-03-24 RX ORDER — FENTANYL CITRATE 50 UG/ML
INJECTION, SOLUTION INTRAMUSCULAR; INTRAVENOUS AS NEEDED
Status: DISCONTINUED | OUTPATIENT
Start: 2025-03-24 | End: 2025-03-24

## 2025-03-24 RX ORDER — MULTIVIT-MIN/IRON FUM/FOLIC AC 7.5 MG-4
1 TABLET ORAL DAILY
Status: DISCONTINUED | OUTPATIENT
Start: 2025-03-24 | End: 2025-03-24 | Stop reason: HOSPADM

## 2025-03-24 RX ORDER — FOLIC ACID 1 MG/1
1 TABLET ORAL DAILY
Status: DISCONTINUED | OUTPATIENT
Start: 2025-03-24 | End: 2025-03-24 | Stop reason: HOSPADM

## 2025-03-24 RX ORDER — LANOLIN ALCOHOL/MO/W.PET/CERES
100 CREAM (GRAM) TOPICAL DAILY
Status: DISCONTINUED | OUTPATIENT
Start: 2025-03-24 | End: 2025-03-24 | Stop reason: HOSPADM

## 2025-03-24 RX ORDER — ACETAMINOPHEN 325 MG/1
975 TABLET ORAL 3 TIMES DAILY
Status: DISCONTINUED | OUTPATIENT
Start: 2025-03-24 | End: 2025-03-24 | Stop reason: HOSPADM

## 2025-03-24 RX ORDER — OXYCODONE HYDROCHLORIDE 5 MG/1
5 TABLET ORAL EVERY 6 HOURS PRN
Qty: 5 TABLET | Refills: 0 | Status: SHIPPED | OUTPATIENT
Start: 2025-03-24 | End: 2025-03-29

## 2025-03-24 RX ORDER — LORAZEPAM 1 MG/1
1 TABLET ORAL EVERY 2 HOUR PRN
Status: DISCONTINUED | OUTPATIENT
Start: 2025-03-24 | End: 2025-03-24 | Stop reason: HOSPADM

## 2025-03-24 RX ORDER — PROPOFOL 10 MG/ML
INJECTION, EMULSION INTRAVENOUS AS NEEDED
Status: DISCONTINUED | OUTPATIENT
Start: 2025-03-24 | End: 2025-03-24

## 2025-03-24 RX ORDER — LORAZEPAM 0.5 MG/1
0.5 TABLET ORAL EVERY 2 HOUR PRN
Status: DISCONTINUED | OUTPATIENT
Start: 2025-03-24 | End: 2025-03-24 | Stop reason: HOSPADM

## 2025-03-24 RX ORDER — LIDOCAINE 560 MG/1
1 PATCH PERCUTANEOUS; TOPICAL; TRANSDERMAL DAILY
Qty: 10 PATCH | Refills: 0 | Status: SHIPPED | OUTPATIENT
Start: 2025-03-25 | End: 2025-04-04

## 2025-03-24 RX ORDER — ATORVASTATIN CALCIUM 40 MG/1
40 TABLET, FILM COATED ORAL NIGHTLY
Qty: 30 TABLET | Refills: 0 | Status: SHIPPED | OUTPATIENT
Start: 2025-03-24 | End: 2025-04-23

## 2025-03-24 RX ORDER — MAGNESIUM SULFATE HEPTAHYDRATE 40 MG/ML
2 INJECTION, SOLUTION INTRAVENOUS ONCE
Status: COMPLETED | OUTPATIENT
Start: 2025-03-24 | End: 2025-03-24

## 2025-03-24 RX ORDER — METOPROLOL TARTRATE 50 MG/1
50 TABLET ORAL 2 TIMES DAILY
Qty: 60 TABLET | Refills: 0 | Status: SHIPPED | OUTPATIENT
Start: 2025-03-24 | End: 2025-04-23

## 2025-03-24 RX ORDER — LORAZEPAM 1 MG/1
2 TABLET ORAL EVERY 2 HOUR PRN
Status: DISCONTINUED | OUTPATIENT
Start: 2025-03-24 | End: 2025-03-24 | Stop reason: HOSPADM

## 2025-03-24 RX ORDER — NAPROXEN SODIUM 220 MG/1
81 TABLET, FILM COATED ORAL DAILY
Qty: 30 TABLET | Refills: 0 | Status: SHIPPED | OUTPATIENT
Start: 2025-03-25 | End: 2025-04-24

## 2025-03-24 RX ADMIN — LIDOCAINE 1 PATCH: 4 PATCH TOPICAL at 08:32

## 2025-03-24 RX ADMIN — ACETAMINOPHEN 975 MG: 325 TABLET ORAL at 14:39

## 2025-03-24 RX ADMIN — THIAMINE HYDROCHLORIDE 100 MG: 100 INJECTION, SOLUTION INTRAMUSCULAR; INTRAVENOUS at 08:33

## 2025-03-24 RX ADMIN — MAGNESIUM SULFATE HEPTAHYDRATE 2 G: 40 INJECTION, SOLUTION INTRAVENOUS at 08:31

## 2025-03-24 RX ADMIN — LORAZEPAM 0.5 MG: 0.5 TABLET ORAL at 09:07

## 2025-03-24 RX ADMIN — LOSARTAN POTASSIUM: 50 TABLET, FILM COATED ORAL at 08:33

## 2025-03-24 RX ADMIN — DICLOFENAC SODIUM 4 G: 10 GEL TOPICAL at 08:34

## 2025-03-24 RX ADMIN — APIXABAN 5 MG: 5 TABLET, FILM COATED ORAL at 08:34

## 2025-03-24 RX ADMIN — OXYCODONE HYDROCHLORIDE 5 MG: 5 TABLET ORAL at 08:34

## 2025-03-24 RX ADMIN — DICLOFENAC SODIUM 4 G: 10 GEL TOPICAL at 03:48

## 2025-03-24 RX ADMIN — PROPOFOL 40 MG: 10 INJECTION, EMULSION INTRAVENOUS at 12:09

## 2025-03-24 RX ADMIN — Medication 1 TABLET: at 09:08

## 2025-03-24 RX ADMIN — Medication 1 CAPSULE: at 08:33

## 2025-03-24 RX ADMIN — FENTANYL CITRATE 50 MCG: 50 INJECTION, SOLUTION INTRAMUSCULAR; INTRAVENOUS at 12:02

## 2025-03-24 RX ADMIN — PROPOFOL 80 MG: 10 INJECTION, EMULSION INTRAVENOUS at 12:07

## 2025-03-24 RX ADMIN — BENZOCAINE 1 SPRAY: 200 SPRAY DENTAL; ORAL; PERIODONTAL at 12:06

## 2025-03-24 RX ADMIN — FENTANYL CITRATE 50 MCG: 50 INJECTION, SOLUTION INTRAMUSCULAR; INTRAVENOUS at 12:09

## 2025-03-24 RX ADMIN — ACETAMINOPHEN 975 MG: 325 TABLET ORAL at 08:33

## 2025-03-24 RX ADMIN — METOPROLOL TARTRATE 50 MG: 50 TABLET, FILM COATED ORAL at 08:34

## 2025-03-24 RX ADMIN — ASPIRIN 81 MG CHEWABLE TABLET 81 MG: 81 TABLET CHEWABLE at 08:33

## 2025-03-24 RX ADMIN — FOLIC ACID 1 MG: 1 TABLET ORAL at 08:34

## 2025-03-24 SDOH — ECONOMIC STABILITY: HOUSING INSECURITY: IN THE LAST 12 MONTHS, WAS THERE A TIME WHEN YOU WERE NOT ABLE TO PAY THE MORTGAGE OR RENT ON TIME?: NO

## 2025-03-24 SDOH — ECONOMIC STABILITY: FOOD INSECURITY: HOW HARD IS IT FOR YOU TO PAY FOR THE VERY BASICS LIKE FOOD, HOUSING, MEDICAL CARE, AND HEATING?: NOT HARD AT ALL

## 2025-03-24 SDOH — HEALTH STABILITY: MENTAL HEALTH: CURRENT SMOKER: 0

## 2025-03-24 SDOH — ECONOMIC STABILITY: INCOME INSECURITY: IN THE PAST 12 MONTHS HAS THE ELECTRIC, GAS, OIL, OR WATER COMPANY THREATENED TO SHUT OFF SERVICES IN YOUR HOME?: NO

## 2025-03-24 SDOH — ECONOMIC STABILITY: FOOD INSECURITY: WITHIN THE PAST 12 MONTHS, YOU WORRIED THAT YOUR FOOD WOULD RUN OUT BEFORE YOU GOT THE MONEY TO BUY MORE.: NEVER TRUE

## 2025-03-24 SDOH — ECONOMIC STABILITY: HOUSING INSECURITY: AT ANY TIME IN THE PAST 12 MONTHS, WERE YOU HOMELESS OR LIVING IN A SHELTER (INCLUDING NOW)?: NO

## 2025-03-24 SDOH — ECONOMIC STABILITY: FOOD INSECURITY: WITHIN THE PAST 12 MONTHS, THE FOOD YOU BOUGHT JUST DIDN'T LAST AND YOU DIDN'T HAVE MONEY TO GET MORE.: NEVER TRUE

## 2025-03-24 SDOH — ECONOMIC STABILITY: HOUSING INSECURITY: IN THE PAST 12 MONTHS, HOW MANY TIMES HAVE YOU MOVED WHERE YOU WERE LIVING?: 0

## 2025-03-24 SDOH — ECONOMIC STABILITY: TRANSPORTATION INSECURITY: IN THE PAST 12 MONTHS, HAS LACK OF TRANSPORTATION KEPT YOU FROM MEDICAL APPOINTMENTS OR FROM GETTING MEDICATIONS?: NO

## 2025-03-24 ASSESSMENT — COGNITIVE AND FUNCTIONAL STATUS - GENERAL
DAILY ACTIVITIY SCORE: 24
MOBILITY SCORE: 24

## 2025-03-24 ASSESSMENT — LIFESTYLE VARIABLES
HEADACHE, FULLNESS IN HEAD: VERY MILD
ORIENTATION AND CLOUDING OF SENSORIUM: ORIENTED AND CAN DO SERIAL ADDITIONS
VISUAL DISTURBANCES: NOT PRESENT
HEADACHE, FULLNESS IN HEAD: NOT PRESENT
AGITATION: NORMAL ACTIVITY
NAUSEA AND VOMITING: NO NAUSEA AND NO VOMITING
NAUSEA AND VOMITING: NO NAUSEA AND NO VOMITING
ANXIETY: 2
TREMOR: NO TREMOR
TOTAL SCORE: 5
ANXIETY: NO ANXIETY, AT EASE
PAROXYSMAL SWEATS: NO SWEAT VISIBLE
AUDITORY DISTURBANCES: NOT PRESENT
VISUAL DISTURBANCES: NOT PRESENT
TOTAL SCORE: 0
ORIENTATION AND CLOUDING OF SENSORIUM: ORIENTED AND CAN DO SERIAL ADDITIONS
TREMOR: NO TREMOR
PAROXYSMAL SWEATS: 2
AGITATION: NORMAL ACTIVITY
AUDITORY DISTURBANCES: NOT PRESENT

## 2025-03-24 ASSESSMENT — ACTIVITIES OF DAILY LIVING (ADL)
LACK_OF_TRANSPORTATION: NO
LACK_OF_TRANSPORTATION: NO

## 2025-03-24 ASSESSMENT — PAIN - FUNCTIONAL ASSESSMENT
PAIN_FUNCTIONAL_ASSESSMENT: 0-10

## 2025-03-24 ASSESSMENT — PAIN DESCRIPTION - DESCRIPTORS
DESCRIPTORS: ACHING
DESCRIPTORS: ACHING

## 2025-03-24 ASSESSMENT — PAIN SCALES - GENERAL
PAINLEVEL_OUTOF10: 0 - NO PAIN
PAINLEVEL_OUTOF10: 2
PAINLEVEL_OUTOF10: 3
PAINLEVEL_OUTOF10: 7
PAINLEVEL_OUTOF10: 7
PAINLEVEL_OUTOF10: 0 - NO PAIN
PAIN_LEVEL: 0
PAINLEVEL_OUTOF10: 0 - NO PAIN
PAINLEVEL_OUTOF10: 0 - NO PAIN
PAINLEVEL_OUTOF10: 2
PAINLEVEL_OUTOF10: 0 - NO PAIN
PAINLEVEL_OUTOF10: 0 - NO PAIN
PAINLEVEL_OUTOF10: 3

## 2025-03-24 NOTE — NURSING NOTE
"UPDATE 1930: Patient used new pain cream medication that seemed to help \"alleviate the pain a little\" per patient.    UPDATE 2131:Patient requesting pain medications for this evening. 5 mg Oxycodone administered  "

## 2025-03-24 NOTE — ANESTHESIA PREPROCEDURE EVALUATION
Patient: Wyatt Malave    Procedure Information       Anesthesia Start Date/Time: 03/24/25 1202    Procedure: TRANSESOPHAGEAL ECHO (KAYLI)    Location: [facility] [place]          Vitals:    03/24/25 1212   BP: 123/85   Pulse: (!) 125   Resp: (!) 8   Temp:    SpO2: 98%       History reviewed. No pertinent surgical history.  Past Medical History:   Diagnosis Date    Hypertension        Current Facility-Administered Medications:     acetaminophen (Tylenol) tablet 975 mg, 975 mg, oral, TID, Johnathan Gonzalez MD    apixaban (Eliquis) tablet 5 mg, 5 mg, oral, BID, Tyrone Lin MD, 5 mg at 03/24/25 0834    aspirin chewable tablet 81 mg, 81 mg, oral, Daily, Park Romero DO, 81 mg at 03/24/25 0833    atorvastatin (Lipitor) tablet 40 mg, 40 mg, oral, Nightly, Tyrone Lin MD, 40 mg at 03/23/25 2020    diclofenac sodium (Voltaren) 1 % gel 4 g, 4 g, Topical, 4x daily PRN, Park Romero DO, 4 g at 03/24/25 0834    folic acid (Folvite) tablet 1 mg, 1 mg, oral, Daily, Johnathan Gonzalez MD    lidocaine 4 % patch 1 patch, 1 patch, transdermal, Daily, Park Romero DO, 1 patch at 03/24/25 0832    LORazepam (Ativan) tablet 0.5 mg, 0.5 mg, oral, q2h PRN, 0.5 mg at 03/24/25 0907 **OR** LORazepam (Ativan) tablet 1 mg, 1 mg, oral, q2h PRN **OR** LORazepam (Ativan) tablet 2 mg, 2 mg, oral, q2h PRN, Johnathan Gonzalez MD    losartan 50 mg, hydroCHLOROthiazide 12.5 mg for Hyzaar 50 mg-12.5 mg, , oral, Daily, Johnathan Gonzalez MD, Given at 03/24/25 0833    metoprolol tartrate (Lopressor) injection 5 mg, 5 mg, intravenous, q15 min PRN, Park Romero DO    metoprolol tartrate (Lopressor) tablet 50 mg, 50 mg, oral, BID, Johnathan Gonzalez MD, 50 mg at 03/24/25 0834    multivitamin with minerals 1 tablet, 1 tablet, oral, Daily, Johnathan Gonzalez MD, 1 tablet at 03/24/25 0908    oxyCODONE (Roxicodone) immediate release tablet 5 mg, 5 mg, oral, q6h PRN, Park Romero DO, 5 mg at 03/24/25 0834    perflutren lipid  microspheres (Definity) injection 0.5-10 mL of dilution, 0.5-10 mL of dilution, intravenous, Once in imaging, Johnathan Gonzalez MD    perflutren protein A microsphere (Optison) injection 0.5 mL, 0.5 mL, intravenous, Once in imaging, Johnathan Gonzalez MD    sulfur hexafluoride microsphr (Lumason) injection 24.28 mg, 2 mL, intravenous, Once in imaging, Johnathan Gonzalez MD    thiamine (Vitamin B-1) tablet 100 mg, 100 mg, oral, Daily, Johnathan Gonzalez MD    vitamin B complex-vitamin C-folic acid (Nephrocaps) capsule 1 capsule, 1 capsule, oral, Daily, Johnathan Gonzalez MD, 1 capsule at 03/24/25 0833  Prior to Admission medications    Medication Sig Start Date End Date Taking? Authorizing Provider   losartan-hydrochlorothiazide (Hyzaar) 50-12.5 mg tablet Take 1 tablet by mouth once daily.    Historical Provider, MD   sildenafil (Viagra) 100 mg tablet Take 1 tablet (100 mg) by mouth once daily as needed for erectile dysfunction.    Historical Provider, MD   tirzepatide, weight loss, 5 mg/0.5 mL solution Inject 5 mg under the skin every 7 days.    Historical Provider, MD   losartan (Cozaar) 100 mg tablet Take 1 tablet (100 mg) by mouth once daily.  3/22/25  Historical Provider, MD     No Known Allergies  Social History     Tobacco Use    Smoking status: Never    Smokeless tobacco: Never   Substance Use Topics    Alcohol use: Not Currently     Alcohol/week: 16.0 standard drinks of alcohol     Types: 16 Shots of liquor per week     Comment: 3-4 drinks 3-4x per week         Chemistry    Lab Results   Component Value Date/Time     (L) 03/24/2025 0332    K 3.9 03/24/2025 0332     03/24/2025 0332    CO2 26 03/24/2025 0332    BUN 18 03/24/2025 0332    CREATININE 0.94 03/24/2025 0332    Lab Results   Component Value Date/Time    CALCIUM 9.1 03/24/2025 0332    ALKPHOS 39 03/23/2025 0502    AST 17 03/23/2025 0502    ALT 18 03/23/2025 0502    BILITOT 1.1 03/23/2025 0502          Lab Results   Component Value Date/Time    WBC  "8.0 03/24/2025 0332    HGB 17.6 (H) 03/24/2025 0332    HCT 53.0 (H) 03/24/2025 0332     03/24/2025 0332     No results found for: \"PROTIME\", \"PTT\", \"INR\"  Encounter Date: 03/22/25   ECG 12 lead   Result Value    Ventricular Rate 155    Atrial Rate 174    QRS Duration 80    QT Interval 292    QTC Calculation(Bazett) 469    R Axis -66    T Axis 63    QRS Count 26    Q Onset 218    T Offset 364    QTC Fredericia 400    Narrative    Atrial fibrillation with rapid ventricular response  Left axis deviation  Septal infarct , age undetermined  Abnormal ECG  No previous ECGs available        Relevant Problems   Cardiac   (+) Atrial fibrillation with RVR (Multi)   (+) HTN (hypertension)      Endocrine   (+) Class 1 obesity due to excess calories without serious comorbidity with body mass index (BMI) of 34.0 to 34.9 in adult       Clinical information reviewed:   Tobacco  Allergies  Meds   Med Hx  Surg Hx   Fam Hx  Soc Hx        NPO Detail:  No data recorded     Physical Exam    Airway  Mallampati: II  TM distance: >3 FB     Cardiovascular - normal exam  Rhythm: regular  Rate: normal     Dental - normal exam     Pulmonary - normal exam     Abdominal - normal exam  Abdomen: soft             Anesthesia Plan    History of general anesthesia?: yes  History of complications of general anesthesia?: no    ASA 2     MAC     The patient is not a current smoker.  Patient was previously instructed to abstain from smoking on day of procedure.  Patient did not smoke on day of procedure.  Education provided regarding risk of obstructive sleep apnea.  intravenous induction   Postoperative administration of opioids is intended.  Anesthetic plan and risks discussed with patient.  Use of blood products discussed with patient who.      "

## 2025-03-24 NOTE — DISCHARGE INSTRUCTIONS
You are admitted here due to new onset atrial fibrillation.  Your heart rhythm successfully converted to normal by cardioversion shock.    -.Please call and follow up with your primary care provider (PCP) for posthospital follow-up.  [Dr. Tong]  -Please call and follow-up with your cardiology.  [Dr. Lin]  -Please call and follow-up with physical therapy as outpatient for your left shoulder pain  -Please call and follow-up with orthopedic [Dr. Norbert Hodges] for left shoulder pain      Please take all of your medications as directed.     New medications:   1-aspirin 81 mg daily  2-atorvastatin 40 mg nightly  3-metoprolol 50 mg twice daily  4-Eliquis 5 mg twice daily, for 30 days  5-lidocaine patch: Apply 1 patch to left shoulder. Patch will remain on for 12 hours, then removed for 12 hours. Do NOT place patch directly over any surgical incisions or wounds.  6-oxycodone 5 mg every 6 hours as needed (you will have a total of 5 pills)    Discontinued medications: None    If you have any concerning symptoms, or worsening symptoms please call or return, such as chest pain, shortness of breath, new onset confusion, loss of sensation, or fever >103F.    Thank you for allowing us to participate in your health care.    -[facility] Teaching Service.

## 2025-03-24 NOTE — HOSPITAL COURSE
59-year-old male with PMHx of hypertension and untreated BOSSMAN.  He presented to ED with left shoulder pain 4-5/10 radiating to neck, pain associated with diaphoresis, dizziness and blurred vision.  Patient denies chest pain and shortness of breath.  Patient reports he drinks alcohol 3-4 times per week about 3-4 liquor per day.  Last drink yesterday night around 7:30 PM about 4-5 cocktails.  In ED he was afebrile , /86, RR 16, SaO2 94% in room air.  CMP remarkable for creatinine 1.48 [baseline 1.1].,  CBC remarkable for leukocytosis 13.2, Hgb 18.7.  EKG reveals atrial fibrillation with RVR , no ischemic changes, QTc 469.  Chest x-ray unremarkable.  CT angio chest rule out pulmonary embolism and aortic dissection.  Patient received metoprolol and Ativan which bring heart rate down.  Patient was admitted for new onset atrial fibrillation with RVR [[id number] score 1-2] and left shoulder pain.  During this admission cardiology was consulted given his borderline [id number] who recommended to start metoprolol 50 mg twice daily and Eliquis 5 mg twice daily.  As well as aspirin and statin for coronary artery calcification repeat in CT scan.  On 3/24 patient in the morning still on A-fib, cardiology proceeded with KAYLI and cardioversion.  Patient converted to sinus rhythm successfully.    Regarding his shoulder pain x-ray to left shoulder revealed osteoarthritis.  Patient discharged home in good condition with Eliquis chest for 1 month.additionally, he was discharged on aspirin, atorvastatin, metoprolol.  Also he was discharged with lidocaine patch and oxycodone for his left shoulder pain.  Patient discharged with instruction when to come back to the hospital, to follow-up with cardiology and PCP for new onset A-fib.  Additionally to follow-up with orthopedic and PT for left shoulder pain

## 2025-03-24 NOTE — CARE PLAN
The patient's goals for the shift include reduced pain    The clinical goals for the shift include Patient will return to NSR by the end of shift      Problem: Pain - Adult  Goal: Verbalizes/displays adequate comfort level or baseline comfort level  Outcome: Progressing     Problem: Safety - Adult  Goal: Free from fall injury  Outcome: Progressing     Problem: Discharge Planning  Goal: Discharge to home or other facility with appropriate resources  Outcome: Progressing     Problem: Chronic Conditions and Co-morbidities  Goal: Patient's chronic conditions and co-morbidity symptoms are monitored and maintained or improved  Outcome: Progressing     Problem: Nutrition  Goal: Nutrient intake appropriate for maintaining nutritional needs  Outcome: Progressing     Problem: Arrythmia/Dysrhythmia  Goal: Lab values return to normal range  Outcome: Progressing  Goal: No evidence of post procedure complications  Outcome: Progressing  Goal: Promote self management  Outcome: Progressing  Goal: Serial ECG will return to baseline  Outcome: Progressing  Goal: Verbalize understanding of procedures/devices  Outcome: Progressing  Goal: Vital signs return to baseline  Outcome: Progressing  Goal: Care and maintenance of device (specify)  Outcome: Progressing     Problem: Pain  Goal: Takes deep breaths with improved pain control throughout the shift  Outcome: Progressing  Goal: Turns in bed with improved pain control throughout the shift  Outcome: Progressing  Goal: Walks with improved pain control throughout the shift  Outcome: Progressing  Goal: Performs ADL's with improved pain control throughout shift  Outcome: Progressing  Goal: Participates in PT with improved pain control throughout the shift  Outcome: Progressing  Goal: Free from opioid side effects throughout the shift  Outcome: Progressing  Goal: Free from acute confusion related to pain meds throughout the shift  Outcome: Progressing

## 2025-03-24 NOTE — PROCEDURES
Indication: Atrial fibrillation    Procedure: Cardioversion    Anesthesia: see anesthesia documentation.    Procedure:    After adequate conscious sedation was achieved and KAYLI showed no NIKI thrombus, we delivered one 325J biphasic synchronized shock successfully converting patient to NSR.    Complications: None    Tyrone Lin MD

## 2025-03-24 NOTE — PROGRESS NOTES
Tatiana Malave is a 59 y.o. male on day 2 of admission presenting with Atrial fibrillation with RVR (Multi).      Subjective   Patient seen and examined this morning at bedside.  No acute event overnight.  Patient still have left shoulder pain 8/10.  He denied chest pain, shortness of breath, nausea or vomiting, dizziness.  No new complaint       Objective     Last Recorded Vitals  BP (!) 157/100 (BP Location: Right arm, Patient Position: Lying)   Pulse (!) 124   Temp 35.2 °C (95.4 °F) (Temporal)   Resp 20   Wt 107 kg (235 lb 14.3 oz)   SpO2 95%   Intake/Output last 3 Shifts:  No intake or output data in the 24 hours ending 03/24/25 1143    Admission Weight  Weight: 117 kg (259 lb) (03/22/25 1225)    Daily Weight  03/22/25 : 107 kg (235 lb 14.3 oz)    Image Results  Transthoracic Echo (TTE) Complete              [facility] [place]  [id number] [facility] [address]     Tel [phone] Fax [phone]    TRANSTHORACIC ECHOCARDIOGRAM REPORT    Patient Name:       TATIANA MALAVE          Reading Physician:    [id number] Tyrone Lin MD  Study Date:         3/23/2025            Ordering Provider:    [id number] GENARO DIAS  MRN/PID:            [id number]             Fellow:  Accession#:         [id number]         Nurse:  Date of Birth/Age:  1965 / 59      Sonographer:          Shameka alva                                      [facility]  Gender Assigned at  M                    Additional Staff:  Birth:  Height:             177.80 cm            Admit Date:  Weight:             106.59 kg            Admission Status:     Inpatient -                                                                 Priority                                                                 discharge  BSA / BMI:          2.24 m2 / 33.72      Department Location:  [facility] CCU SD                      kg/m2  Blood Pressure: 159 /96 mmHg    Study Type:     TRANSTHORACIC ECHO (TTE) COMPLETE  Diagnosis/ICD: Unspecified atrial fibrillation-I48.91  Indication:    new onset afib  CPT Codes:     Echo Complete w Full [id number]    Patient History:  Pertinent History: HTN and A-Fib.    Study Detail: The following Echo studies were performed: 2D, M-Mode and Doppler.       PHYSICIAN INTERPRETATION:  Left Ventricle: The left ventricular systolic function is mildly decreased, with a visually estimated ejection fraction of 50%. The patient is in atrial fibrillation which may influence the estimate of left ventricular function and transvalvular flows. There is mild concentric left ventricular hypertrophy. There are no regional wall motion abnormalities. The left ventricular cavity size is normal. There is mild increased septal and mildly increased posterior left ventricular wall thickness. There is left ventricular concentric remodeling. Left ventricular diastolic filling was indeterminate due to atrial fibrillation/flutter.  Left Atrium: The left atrial size is moderately dilated.  Right Ventricle: The right ventricle is normal in size. There is normal right ventricular global systolic function.  Right Atrium: The right atrial size is mildly dilated.  Aortic Valve: The aortic valve is trileaflet. There is no evidence of aortic valve regurgitation. The peak instantaneous gradient of the aortic valve is 5 mmHg.  Mitral Valve: The mitral valve is normal in structure. There is trace mitral valve regurgitation.  Tricuspid Valve: The tricuspid valve is structurally normal. There is trace tricuspid regurgitation. The Doppler estimated RVSP is within normal limits at 22.2 mmHg.  Pulmonic Valve: The pulmonic valve is structurally normal. There is no indication of pulmonic valve regurgitation.  Pericardium: No pericardial effusion noted.  Aorta: The aortic root is normal.  In comparison to the previous echocardiogram(s): There are no prior studies on this patient for comparison  purposes.       CONCLUSIONS:   1. The left ventricular systolic function is mildly decreased, with a visually estimated ejection fraction of 50%.   2. Left ventricular diastolic filling was indeterminate due to atrial fibrillation/flutter.   3. The left atrial size is moderately dilated.   4. Right ventricular systolic pressure is within normal limits.   5. The patient is in atrial fibrillation which may influence the estimate of left ventricular function and transvalvular flows.    QUANTITATIVE DATA SUMMARY:     2D MEASUREMENTS:             Normal Ranges:  LAs:             4.52 cm     (2.7-4.0cm)  IVSd:            1.45 cm     (0.6-1.1cm)  LVPWd:           1.29 cm     (0.6-1.1cm)  LVIDd:           4.96 cm     (3.9-5.9cm)  LVIDs:           4.13 cm  LV Mass Index:   124.4 g/m2  LVEDV Index:     38.00 ml/m2  LV % FS          16.6 %       LEFT ATRIUM:                 Normal Ranges:  LA Area A4C:      25.0 cm2  LA Area A2C:      26.0 cm2  LA Volume Index:  41.0 ml/m2  LA Vol A4C:       77.3 ml  LA Vol A2C:       88.4 ml  LA Vol Index BSA: 37.1 ml/m2       M-MODE MEASUREMENTS:         Normal Ranges:  Ao Root:             3.80 cm (2.0-3.7cm)  AoV Exc:             2.46 cm (1.5-2.5cm)       AORTA MEASUREMENTS:         Normal Ranges:  AoV Exc:            2.46 cm (1.5-2.5cm)  Ao Sinus, d:        4.40 cm (2.1-3.5cm)  Ao STJ, d:          3.20 cm (1.7-3.4cm)  Asc Ao, d:          3.90 cm (2.1-3.4cm)       LV SYSTOLIC FUNCTION:                       Normal Ranges:  EF-A4C View:    53 % (>=55%)  EF-A2C View:    40 %  EF-Biplane:     48 %  EF-Visual:      50 %  LV EF Reported: 50 %       LV DIASTOLIC FUNCTION:           Normal Ranges:  MV Peak E:             0.91 m/s  (0.7-1.2 m/s)  MV e'                  0.132 m/s (>8.0)  MV lateral e'          0.16 m/s  MV medial e'           0.11 m/s  E/e' Ratio:            6.87      (<8.0)       MITRAL VALVE:          Normal Ranges:  MV DT:        135 msec (150-240msec)       AORTIC VALVE:            Normal Ranges:  AoV Vmax:      1.09 m/s (<=1.7m/s)  AoV Peak P.7 mmHg (<20mmHg)  LVOT Max Doug:  0.98 m/s (<=1.1m/s)  LVOT VTI:      17.41 cm  LVOT Diameter: 2.34 cm  (1.8-2.4cm)  AoV Area,Vmax: 3.84 cm2 (2.5-4.5cm2)       RIGHT VENTRICLE:  RV Basal 3.80 cm  RV Mid   1.90 cm  RV Major 7.8 cm  TAPSE:   18.0 mm  RV s'    0.13 m/s       TRICUSPID VALVE/RVSP:          Normal Ranges:  Peak TR Velocity:     2.19 m/s  RV Syst Pressure:     22 mmHg  (< 30mmHg)  IVC Diam:             2.17 cm       PULMONIC VALVE:         Normal Ranges:  PV Accel Time:  71 msec (>120ms)       AORTA:  Asc Ao Diam 3.88 cm       [id number] Tyrone Lin MD  Electronically signed on 3/23/2025 at 1:00:08 PM       ** Final **      Physical Exam  Constitutional:       Appearance: Normal appearance.   HENT:      Head: Normocephalic and atraumatic.      Mouth/Throat:      Mouth: Mucous membranes are moist.   Cardiovascular:      Rate and Rhythm: Tachycardia present. Rhythm irregular.      Heart sounds: No murmur heard.     No gallop.   Pulmonary:      Effort: No respiratory distress.      Breath sounds: No wheezing, rhonchi or rales.   Abdominal:      Palpations: Abdomen is soft.      Tenderness: There is no abdominal tenderness.   Musculoskeletal:      Right lower leg: No edema.      Left lower leg: No edema.   Skin:     General: Skin is warm.   Neurological:      Mental Status: He is alert and oriented to person, place, and time.         Relevant Results  Scheduled medications  acetaminophen, 975 mg, oral, TID  apixaban, 5 mg, oral, BID  aspirin, 81 mg, oral, Daily  atorvastatin, 40 mg, oral, Nightly  folic acid, 1 mg, oral, Daily  lidocaine, 1 patch, transdermal, Daily  losartan 50 mg, hydroCHLOROthiazide 12.5 mg for Hyzaar 50 mg-12.5 mg, , oral, Daily  metoprolol tartrate, 50 mg, oral, BID  multivitamin with minerals, 1 tablet, oral, Daily  perflutren lipid microspheres, 0.5-10 mL of dilution, intravenous, Once in imaging  perflutren protein A  microsphere, 0.5 mL, intravenous, Once in imaging  sulfur hexafluoride microsphr, 2 mL, intravenous, Once in imaging  thiamine, 100 mg, oral, Daily  vitamin B complex-vitamin C-folic acid, 1 capsule, oral, Daily      Continuous medications     PRN medications  PRN medications: diclofenac sodium, LORazepam **OR** LORazepam **OR** LORazepam, metoprolol, oxyCODONE  Results for orders placed or performed during the hospital encounter of 03/22/25 (from the past 24 hours)   CBC   Result Value Ref Range    WBC 8.0 4.4 - 11.3 x10*3/uL    nRBC 0.0 0.0 - 0.0 /100 WBCs    RBC 6.07 (H) 4.50 - 5.90 x10*6/uL    Hemoglobin 17.6 (H) 13.5 - 17.5 g/dL    Hematocrit 53.0 (H) 41.0 - 52.0 %    MCV 87 80 - 100 fL    MCH 29.0 26.0 - 34.0 pg    MCHC 33.2 32.0 - 36.0 g/dL    RDW 12.9 11.5 - 14.5 %    Platelets 213 150 - 450 x10*3/uL   Basic metabolic panel   Result Value Ref Range    Glucose 90 74 - 99 mg/dL    Sodium 135 (L) 136 - 145 mmol/L    Potassium 3.9 3.5 - 5.3 mmol/L    Chloride 100 98 - 107 mmol/L    Bicarbonate 26 21 - 32 mmol/L    Anion Gap 13 10 - 20 mmol/L    Urea Nitrogen 18 6 - 23 mg/dL    Creatinine 0.94 0.50 - 1.30 mg/dL    eGFR >90 >60 mL/min/1.73m*2    Calcium 9.1 8.6 - 10.3 mg/dL   Magnesium   Result Value Ref Range    Magnesium 1.89 1.60 - 2.40 mg/dL   Drug Screen, Urine With Reflex to Confirmation   Result Value Ref Range    Amphetamine Screen, Urine Presumptive Negative Presumptive Negative    Barbiturate Screen, Urine Presumptive Negative Presumptive Negative    Benzodiazepines Screen, Urine Presumptive Negative Presumptive Negative    Cannabinoid Screen, Urine Presumptive Positive (A) Presumptive Negative    Cocaine Metabolite Screen, Urine Presumptive Negative Presumptive Negative    Fentanyl Screen, Urine Presumptive Negative Presumptive Negative    Opiate Screen, Urine Presumptive Positive (A) Presumptive Negative    Oxycodone Screen, Urine Presumptive Positive (A) Presumptive Negative    PCP Screen, Urine  Presumptive Negative Presumptive Negative    Methadone Screen, Urine Presumptive Negative Presumptive Negative     Transthoracic Echo (TTE) Complete    Result Date: 3/23/2025            [facility] [address] [facility], [place] [address]    Tel [phone] Fax [phone] TRANSTHORACIC ECHOCARDIOGRAM REPORT Patient Name:       TATIANA MIRAMONTES          Reading Physician:    [id number] Tyrone Lin MD Study Date:         3/23/2025            Ordering Provider:    [id number] GENARO DAVILAKE MRN/PID:            [id number]             Fellow: Accession#:         [id number]         Nurse: Date of Birth/Age:  1965 / 59      Sonographer:          Shameka alva                                      [facility] Gender Assigned at                      Additional Staff: Birth: Height:             177.80 cm            Admit Date: Weight:             106.59 kg            Admission Status:     Inpatient -                                                                Priority                                                                discharge BSA / BMI:          2.24 m2 / 33.72      Department Location:  [facility] SD                     kg/m2 Blood Pressure: 159 /96 mmHg Study Type:    TRANSTHORACIC ECHO (TTE) COMPLETE Diagnosis/ICD: Unspecified atrial fibrillation-I48.91 Indication:    new onset afib CPT Codes:     Echo Complete w Full Doppler-[id number] Patient History: Pertinent History: HTN and A-Fib. Study Detail: The following Echo studies were performed: 2D, M-Mode and Doppler.  PHYSICIAN INTERPRETATION: Left Ventricle: The left ventricular systolic function is mildly decreased, with a visually estimated ejection fraction of 50%. The patient is in atrial fibrillation which may influence the estimate of left ventricular function and transvalvular flows. There is mild concentric left ventricular hypertrophy. There are no regional wall motion  abnormalities. The left ventricular cavity size is normal. There is mild increased septal and mildly increased posterior left ventricular wall thickness. There is left ventricular concentric remodeling. Left ventricular diastolic filling was indeterminate due to atrial fibrillation/flutter. Left Atrium: The left atrial size is moderately dilated. Right Ventricle: The right ventricle is normal in size. There is normal right ventricular global systolic function. Right Atrium: The right atrial size is mildly dilated. Aortic Valve: The aortic valve is trileaflet. There is no evidence of aortic valve regurgitation. The peak instantaneous gradient of the aortic valve is 5 mmHg. Mitral Valve: The mitral valve is normal in structure. There is trace mitral valve regurgitation. Tricuspid Valve: The tricuspid valve is structurally normal. There is trace tricuspid regurgitation. The Doppler estimated RVSP is within normal limits at 22.2 mmHg. Pulmonic Valve: The pulmonic valve is structurally normal. There is no indication of pulmonic valve regurgitation. Pericardium: No pericardial effusion noted. Aorta: The aortic root is normal. In comparison to the previous echocardiogram(s): There are no prior studies on this patient for comparison purposes.  CONCLUSIONS:  1. The left ventricular systolic function is mildly decreased, with a visually estimated ejection fraction of 50%.  2. Left ventricular diastolic filling was indeterminate due to atrial fibrillation/flutter.  3. The left atrial size is moderately dilated.  4. Right ventricular systolic pressure is within normal limits.  5. The patient is in atrial fibrillation which may influence the estimate of left ventricular function and transvalvular flows. QUANTITATIVE DATA SUMMARY:  2D MEASUREMENTS:             Normal Ranges: LAs:             4.52 cm     (2.7-4.0cm) IVSd:            1.45 cm     (0.6-1.1cm) LVPWd:           1.29 cm     (0.6-1.1cm) LVIDd:           4.96 cm      (3.9-5.9cm) LVIDs:           4.13 cm LV Mass Index:   124.4 g/m2 LVEDV Index:     38.00 ml/m2 LV % FS          16.6 %  LEFT ATRIUM:                 Normal Ranges: LA Area A4C:      25.0 cm2 LA Area A2C:      26.0 cm2 LA Volume Index:  41.0 ml/m2 LA Vol A4C:       77.3 ml LA Vol A2C:       88.4 ml LA Vol Index BSA: 37.1 ml/m2  M-MODE MEASUREMENTS:         Normal Ranges: Ao Root:             3.80 cm (2.0-3.7cm) AoV Exc:             2.46 cm (1.5-2.5cm)  AORTA MEASUREMENTS:         Normal Ranges: AoV Exc:            2.46 cm (1.5-2.5cm) Ao Sinus, d:        4.40 cm (2.1-3.5cm) Ao STJ, d:          3.20 cm (1.7-3.4cm) Asc Ao, d:          3.90 cm (2.1-3.4cm)  LV SYSTOLIC FUNCTION:                      Normal Ranges: EF-A4C View:    53 % (>=55%) EF-A2C View:    40 % EF-Biplane:     48 % EF-Visual:      50 % LV EF Reported: 50 %  LV DIASTOLIC FUNCTION:           Normal Ranges: MV Peak E:             0.91 m/s  (0.7-1.2 m/s) MV e'                  0.132 m/s (>8.0) MV lateral e'          0.16 m/s MV medial e'           0.11 m/s E/e' Ratio:            6.87      (<8.0)  MITRAL VALVE:          Normal Ranges: MV DT:        135 msec (150-240msec)  AORTIC VALVE:           Normal Ranges: AoV Vmax:      1.09 m/s (<=1.7m/s) AoV Peak P.7 mmHg (<20mmHg) LVOT Max Doug:  0.98 m/s (<=1.1m/s) LVOT VTI:      17.41 cm LVOT Diameter: 2.34 cm  (1.8-2.4cm) AoV Area,Vmax: 3.84 cm2 (2.5-4.5cm2)  RIGHT VENTRICLE: RV Basal 3.80 cm RV Mid   1.90 cm RV Major 7.8 cm TAPSE:   18.0 mm RV s'    0.13 m/s  TRICUSPID VALVE/RVSP:          Normal Ranges: Peak TR Velocity:     2.19 m/s RV Syst Pressure:     22 mmHg  (< 30mmHg) IVC Diam:             2.17 cm  PULMONIC VALVE:         Normal Ranges: PV Accel Time:  71 msec (>120ms)  AORTA: Asc Ao Diam 3.88 cm  [id number] Tyrone Lin MD Electronically signed on 3/23/2025 at 1:00:08 PM  ** Final **     XR shoulder left 2+ views    Result Date: 3/22/2025  Interpreted By:  Marty Wong, STUDY: XR SHOULDER LEFT 2+ VIEWS;  ;  3/22/2025 7:43 pm   INDICATION: Shoulder pain.   COMPARISON: None.   ACCESSION NUMBER(S): [id number]   ORDERING CLINICIAN: GENARO DIAS   FINDINGS: No acute fracture or dislocation of the left shoulder. There is left shoulder osteoarthrosis. There is glenohumeral osteoarthrosis with osseous spurring at the humeral head.       No acute fracture or dislocation of the left shoulder.   Left shoulder osteoarthrosis.   MACRO: None   Signed by: Marty Wong 3/22/2025 8:14 PM Dictation workstation:   [id number]    CT angio chest w and wo IV contrast    Result Date: 3/22/2025  STUDY: CT Angiogram of the Chest; 03/22/2025 3:55 PM INDICATION: Assess for dissection with worsening pain in bilateral arms despite normal troponin and EKG in setting of new atrial fibrillation with .4. COMPARISON: None. ACCESSION NUMBER(S): [id number] ORDERING CLINICIAN: GENARO DIAS TECHNIQUE:  CTA of the chest was performed without and with intravenous contrast.  Images are reviewed and processed at a workstation according to the CT angiogram protocol with 3-D and/or MIP post processing imaging generated.  90 mL Omnipaque-350 was administered intravenously.  1726 DICOM images received. Automated mA/kV exposure control was utilized and patient examination was performed in strict accordance with principles of ALARA. FINDINGS: Pulmonary arteries are adequately opacified without acute or chronic filling defects.  The thoracic aorta is normal in course and caliber without dissection or aneurysm.  Coronary artery calcifications are present. The heart is normal in size without pericardial effusion.  Thoracic lymph nodes are not enlarged. There is no pleural effusion, pleural thickening, or pneumothorax. The airways are patent. Lungs are clear without consolidation, interstitial disease, or suspicious nodules. Upper abdomen demonstrates no acute pathology. There are no acute fractures.  No suspicious bony lesions.    1.  No evidence for  thoracic aortic dissection or aneurysmal dilatation. 2.  No evidence for pulmonary embolus or acute lung parenchymal process. 3.  Coronary artery calcifications. Signed by Rafa Ellison MD    XR chest 1 view    Result Date: 3/22/2025  STUDY: Chest Radiograph;  3/22/2025 at 1:54 PM. INDICATION: Chest pain. COMPARISON: None available. ACCESSION NUMBER(S): [id number] ORDERING CLINICIAN: GENARO DIAS TECHNIQUE:  Frontal chest was obtained at 13:54 hours (two images). FINDINGS: CARDIOMEDIASTINAL SILHOUETTE: Cardiomediastinal silhouette is normal in size and configuration.  LUNGS: Lungs are clear.  There is no evidence of pneumothorax or pleural effusion.  ABDOMEN: No remarkable upper abdominal findings.  BONES: No acute osseous changes.    No acute cardiopulmonary process. Signed by Rafa Ellison MD    ECG 12 lead    Result Date: 3/22/2025  Atrial fibrillation with rapid ventricular response Left axis deviation Septal infarct , age undetermined Abnormal ECG No previous ECGs available     Assessment/Plan   59-year-old male with PMHx of hypertension and untreated BOSSMAN.  He presented to ED with left shoulder pain 4-5/10 radiating to neck, pain associated with diaphoresis, dizziness and blurred vision.  Patient denies chest pain and shortness of breath.  Patient reports he drinks alcohol 3-4 times per week about 3-4 liquor per day.  Last drink yesterday night around 7:30 PM about 4-5 cocktails.  In ED he was afebrile , /86, RR 16, SaO2 94% in room air.  CMP remarkable for creatinine 1.48 [baseline 1.1].,  CBC remarkable for leukocytosis 13.2, Hgb 18.7.  EKG reveals atrial fibrillation with RVR , no ischemic changes, QTc 469.  Chest x-ray unremarkable.  CT angio chest rule out pulmonary embolism and aortic dissection.  Patient received metoprolol and Ativan which bring heart rate down.      #New onset atrial fibrillation with RVR (REINIER-VASCs 1)  #Heavy alcohol drinking  #Hypertension  #Untreated  BOSSMAN  Patient came with left shoulder pain noted with diaphoresis, dizziness, blurry vision.  -Patient drinks alcohol 3-4 times per week about 3-4 liquor per day.    -Last drink yesterday 7:30 PM 4-5 drinks  EKG showed A-fib with  RVR ,, no ischemic changes.  Chest x-ray unremarkable, CT angiogram rule out aortic dissection, PE  -Echocardiogram 3/23 reveals EF 50%, left atrial size moderately dilated, right ventricular systolic pressure within normal limits.  -Cardiology recommended starting Eliquis, continue with metoprolol AV converted to NSR we can discharge home on Eliquis and metoprolol.  If not they will do  -TSH and lipid panel within normal range, hemoglobin A1c 5.8  -Patient still on A-fib plan for KAYLI and cardioversion today    Plan:  -KYALI plus cardioversion today  -Telemetry bed  -Continue statin for chronic calcification  -Continue metoprolol dose to 50 mg twice daily  -Continue anticoagulant to Eliquis 5 mg twice daily    #Alcohol use disorder  Last drink on 3/21 at 7:30 PM.  Patient today has anxiety, sweating may be due to left shoulder pain however given alcohol use we will start CIWA protocol  Plan:  -CIWA protocol activated  -Ativan p.o. as needed  -Continue multivitamin and thiamine      #Left shoulder pain  -more with  abduction. Mild tenderness in deltoid muscle. No weakness  -Left shoulder x-ray shows osteoarthritis, no fracture or displacement.  Plan:  - pain meds: Schedule Tylenol for mild pain, lidocaine patch and as needed oxycodone for moderate and severe pain, Voltaren gel and ice pack     #Acute kidney injury--- resolved  Most likely prerenal due to dehydration and low effective blood flow to kidney in setting of fast heart rate.  Creatinine on admission 1.48   -Creatinine today 0.94  -Continue losartan/hydrochlorothiazide     #Mild leukocytosis  On admission WBC 13.2 most likely due to stress.  Hgb 18.7 most likely due to untreated BOSSMAN or may be due to dehydration.  -Today WBC 8         Hgb 17.6.  -Will continue monitor CBC     Johnathan Gonzalez MD  Internal medicine, PGY1

## 2025-03-24 NOTE — CARE PLAN
The patient's goals for the shift include reduced pain    The clinical goals for the shift include Patient will remain HdS throughout the shift    Over the shift, the patient did not make progress toward the following goals. Barriers to progression include pain. Recommendations to address these barriers include follow physicians orders.

## 2025-03-24 NOTE — ANESTHESIA POSTPROCEDURE EVALUATION
Patient: Wyatt Malave    Procedure Summary       Date: 03/24/25 [place] [facility] [place]    Anesthesia Start: 1202 Anesthesia Stop: 1221    Procedure: TRANSESOPHAGEAL ECHO (KAYLI) Diagnosis:       Atrial fibrillation with RVR (Multi)      (afib)    Scheduled Providers:  Responsible Provider: Dino Burks DO    Anesthesia Type: general ASA Status: Not recorded            Anesthesia Type: general    Vitals Value Taken Time   /94 03/24/25 1222   Temp 36 03/24/25 1222   Pulse 64 03/24/25 1222   Resp 15 03/24/25 1222   SpO2 100 03/24/25 1222       Anesthesia Post Evaluation    Patient location during evaluation: PACU  Patient participation: complete - patient cannot participate  Level of consciousness: sleepy but conscious  Pain score: 0  Pain management: adequate  There was medical reason for not using a multimodal analgesia pain management approach.Airway patency: patent  Two or more strategies used to mitigate risk of obstructive sleep apnea  Cardiovascular status: acceptable and stable  Respiratory status: acceptable, nonlabored ventilation, unassisted, spontaneous ventilation and nasal cannula  Hydration status: acceptable  Postoperative Nausea and Vomiting: none        No notable events documented.

## 2025-03-24 NOTE — NURSING NOTE
Patient had KAYLI and was cardioverted. Vitals are within normal range. Patient states pain is better, but was asking if someone would be in to check on his shoulder. PT did come to work with patient but decided they would come back after test. Patient states he feels a lot better. He is alert and oriented. He is not complaining of pain or discomfort. Hourly rounding has been performed. Will continue to monitor.

## 2025-03-24 NOTE — PROGRESS NOTES
03/24/25 1025   Discharge Planning   Living Arrangements Spouse/significant other   Support Systems Spouse/significant other   Assistance Needed none   Type of Residence Private residence   Number of Stairs to Enter Residence 0   Number of Stairs Within Residence 11  (up to BR level)   Do you have animals or pets at home? Yes   Type of Animals or Pets dog   Who is requesting discharge planning? Provider   Home or Post Acute Services None   Expected Discharge Disposition Home   Does the patient need discharge transport arranged? No   Financial Resource Strain   How hard is it for you to pay for the very basics like food, housing, medical care, and heating? Not hard   Housing Stability   In the last 12 months, was there a time when you were not able to pay the mortgage or rent on time? N   In the past 12 months, how many times have you moved where you were living? 0   At any time in the past 12 months, were you homeless or living in a shelter (including now)? N   Transportation Needs   In the past 12 months, has lack of transportation kept you from medical appointments or from getting medications? no   In the past 12 months, has lack of transportation kept you from meetings, work, or from getting things needed for daily living? No   Patient Choice   Patient / Family choosing to utilize agency / facility established prior to hospitalization No   Stroke Family Assessment   Stroke Family Assessment Needed No   Intensity of Service   Intensity of Service 0-30 min     Met with patient and spouse at bedside. Introduced self and role in hospital. Verified address, insurance, which is [employer], and PCP is Dr. Mateo Tong. Uses [facility] for medications and has no issues in obtaining/paying for them and manages his own medications. Does not use any DME, still drives, works full time, is self employed and is independent with ADL's. Patient feels that he can manages his health and will  return home on discharge with no needs. CT team to follow.

## 2025-03-24 NOTE — PROGRESS NOTES
"  Patient: Wyatt Malave  : 1965  MRN: [id number]    Today's Date: 25  Hospital Day: #2    Primary Cardiologist:    ASSESSMENT/PLAN:  Persistent atrial fibrillation: Successful cardioversion to normal sinus rhythm.  Okay for discharge home on beta-blocker and oral anticoagulation.  Mitral and aortic valve regurgitation: Both mild.  No need for any specific workup at this time.  Disp: Okay for discharge home later today, RTO in my office in 2 to 3 weeks.      SUBJECTIVE:  Patient without complaints overnight.  Remained in atrial fibrillation.  We successfully performed a transesophageal echocardiogram and cardioversion.  Transesophageal echocardiogram showed mild aortic valve insufficiency and mild mitral regurgitation.  There was mild spontaneous contrast seen in the left atrium but no left atrial appendage thrombus.  We performed 1 successful 320 J biphasic shock successfully cardioverting patient to sinus rhythm.  Patient tolerated procedure well.    OBJECTIVE:  VITALS: /85   Pulse (!) 125   Temp 35 °C (95 °F) (Temporal)   Resp (!) 8   Ht 1.854 m (6' 0.99\")   Wt 107 kg (235 lb 14.3 oz)   SpO2 98%   BMI 31.13 kg/m²     No intake or output data in the 24 hours ending 25 1217    Physical Exam  Vitals reviewed.   Constitutional:       Appearance: Normal appearance.   Eyes:      Pupils: Pupils are equal, round, and reactive to light.   Neck:      Vascular: No JVD.   Cardiovascular:      Rate and Rhythm: Normal rate and regular rhythm.      Pulses: Normal pulses.      Heart sounds: No murmur heard.     No gallop.   Pulmonary:      Effort: No respiratory distress.      Breath sounds: No wheezing or rales.   Abdominal:      General: Abdomen is flat. There is no distension.      Palpations: Abdomen is soft.   Musculoskeletal:         General: No swelling.      Right lower leg: No edema.      Left lower leg: No edema.   Neurological:      General: No focal deficit present.      Mental Status: " He is alert.   Psychiatric:         Mood and Affect: Mood normal.          Meds:Scheduled medications  acetaminophen, 975 mg, oral, TID  apixaban, 5 mg, oral, BID  aspirin, 81 mg, oral, Daily  atorvastatin, 40 mg, oral, Nightly  folic acid, 1 mg, oral, Daily  lidocaine, 1 patch, transdermal, Daily  losartan 50 mg, hydroCHLOROthiazide 12.5 mg for Hyzaar 50 mg-12.5 mg, , oral, Daily  metoprolol tartrate, 50 mg, oral, BID  multivitamin with minerals, 1 tablet, oral, Daily  perflutren lipid microspheres, 0.5-10 mL of dilution, intravenous, Once in imaging  perflutren protein A microsphere, 0.5 mL, intravenous, Once in imaging  sulfur hexafluoride microsphr, 2 mL, intravenous, Once in imaging  thiamine, 100 mg, oral, Daily  vitamin B complex-vitamin C-folic acid, 1 capsule, oral, Daily      Continuous medications     PRN medications  PRN medications: diclofenac sodium, LORazepam **OR** LORazepam **OR** LORazepam, metoprolol, oxyCODONE    Infusions:     DIAGNOSTIC DATA:  Results for orders placed or performed during the hospital encounter of 03/22/25 (from the past 24 hours)   CBC   Result Value Ref Range    WBC 8.0 4.4 - 11.3 x10*3/uL    nRBC 0.0 0.0 - 0.0 /100 WBCs    RBC 6.07 (H) 4.50 - 5.90 x10*6/uL    Hemoglobin 17.6 (H) 13.5 - 17.5 g/dL    Hematocrit 53.0 (H) 41.0 - 52.0 %    MCV 87 80 - 100 fL    MCH 29.0 26.0 - 34.0 pg    MCHC 33.2 32.0 - 36.0 g/dL    RDW 12.9 11.5 - 14.5 %    Platelets 213 150 - 450 x10*3/uL   Basic metabolic panel   Result Value Ref Range    Glucose 90 74 - 99 mg/dL    Sodium 135 (L) 136 - 145 mmol/L    Potassium 3.9 3.5 - 5.3 mmol/L    Chloride 100 98 - 107 mmol/L    Bicarbonate 26 21 - 32 mmol/L    Anion Gap 13 10 - 20 mmol/L    Urea Nitrogen 18 6 - 23 mg/dL    Creatinine 0.94 0.50 - 1.30 mg/dL    eGFR >90 >60 mL/min/1.73m*2    Calcium 9.1 8.6 - 10.3 mg/dL   Magnesium   Result Value Ref Range    Magnesium 1.89 1.60 - 2.40 mg/dL   Drug Screen, Urine With Reflex to Confirmation   Result Value  Ref Range    Amphetamine Screen, Urine Presumptive Negative Presumptive Negative    Barbiturate Screen, Urine Presumptive Negative Presumptive Negative    Benzodiazepines Screen, Urine Presumptive Negative Presumptive Negative    Cannabinoid Screen, Urine Presumptive Positive (A) Presumptive Negative    Cocaine Metabolite Screen, Urine Presumptive Negative Presumptive Negative    Fentanyl Screen, Urine Presumptive Negative Presumptive Negative    Opiate Screen, Urine Presumptive Positive (A) Presumptive Negative    Oxycodone Screen, Urine Presumptive Positive (A) Presumptive Negative    PCP Screen, Urine Presumptive Negative Presumptive Negative    Methadone Screen, Urine Presumptive Negative Presumptive Negative        Results for orders placed during the hospital encounter of 03/22/25    Transthoracic Echo (TTE) Complete    Narrative  [facility]  [id number] [id number]  Tel [phone] Fax [phone]    TRANSTHORACIC ECHOCARDIOGRAM REPORT    Patient Name:       TATIANA Vivas Physician:    [id number] Tyrone Lin MD  Study Date:         3/23/2025            Ordering Provider:    [id number] GENARO DIAS  MRN/PID:            [id number]             Fellow:  Accession#:         [id number]         Nurse:  Date of Birth/Age:  1965 / 59      Sonographer:          Shameka alva                                      [facility]  Gender Assigned at                      Additional Staff:  Birth:  Height:             177.80 cm            Admit Date:  Weight:             106.59 kg            Admission Status:     Inpatient -  Priority  discharge  BSA / BMI:          2.24 m2 / 33.72      Department Location:  [facility] CCU SD  kg/m2  Blood Pressure: 159 /96 mmHg    Study Type:    TRANSTHORACIC ECHO (TTE) COMPLETE  Diagnosis/ICD: Unspecified atrial fibrillation-I48.91  Indication:    new onset afib  CPT Codes:     Echo Complete w Full [id number]    Patient History:  Pertinent History: HTN  and A-Fib.    Study Detail: The following Echo studies were performed: 2D, M-Mode and Doppler.      PHYSICIAN INTERPRETATION:  Left Ventricle: The left ventricular systolic function is mildly decreased, with a visually estimated ejection fraction of 50%. The patient is in atrial fibrillation which may influence the estimate of left ventricular function and transvalvular flows. There is mild concentric left ventricular hypertrophy. There are no regional wall motion abnormalities. The left ventricular cavity size is normal. There is mild increased septal and mildly increased posterior left ventricular wall thickness. There is left ventricular concentric remodeling. Left ventricular diastolic filling was indeterminate due to atrial fibrillation/flutter.  Left Atrium: The left atrial size is moderately dilated.  Right Ventricle: The right ventricle is normal in size. There is normal right ventricular global systolic function.  Right Atrium: The right atrial size is mildly dilated.  Aortic Valve: The aortic valve is trileaflet. There is no evidence of aortic valve regurgitation. The peak instantaneous gradient of the aortic valve is 5 mmHg.  Mitral Valve: The mitral valve is normal in structure. There is trace mitral valve regurgitation.  Tricuspid Valve: The tricuspid valve is structurally normal. There is trace tricuspid regurgitation. The Doppler estimated RVSP is within normal limits at 22.2 mmHg.  Pulmonic Valve: The pulmonic valve is structurally normal. There is no indication of pulmonic valve regurgitation.  Pericardium: No pericardial effusion noted.  Aorta: The aortic root is normal.  In comparison to the previous echocardiogram(s): There are no prior studies on this patient for comparison purposes.      CONCLUSIONS:  1. The left ventricular systolic function is mildly decreased, with a visually estimated ejection fraction of 50%.  2. Left ventricular diastolic filling was indeterminate due to atrial  fibrillation/flutter.  3. The left atrial size is moderately dilated.  4. Right ventricular systolic pressure is within normal limits.  5. The patient is in atrial fibrillation which may influence the estimate of left ventricular function and transvalvular flows.    QUANTITATIVE DATA SUMMARY:    2D MEASUREMENTS:             Normal Ranges:  LAs:             4.52 cm     (2.7-4.0cm)  IVSd:            1.45 cm     (0.6-1.1cm)  LVPWd:           1.29 cm     (0.6-1.1cm)  LVIDd:           4.96 cm     (3.9-5.9cm)  LVIDs:           4.13 cm  LV Mass Index:   124.4 g/m2  LVEDV Index:     38.00 ml/m2  LV % FS          16.6 %      LEFT ATRIUM:                 Normal Ranges:  LA Area A4C:      25.0 cm2  LA Area A2C:      26.0 cm2  LA Volume Index:  41.0 ml/m2  LA Vol A4C:       77.3 ml  LA Vol A2C:       88.4 ml  LA Vol Index BSA: 37.1 ml/m2      M-MODE MEASUREMENTS:         Normal Ranges:  Ao Root:             3.80 cm (2.0-3.7cm)  AoV Exc:             2.46 cm (1.5-2.5cm)      AORTA MEASUREMENTS:         Normal Ranges:  AoV Exc:            2.46 cm (1.5-2.5cm)  Ao Sinus, d:        4.40 cm (2.1-3.5cm)  Ao STJ, d:          3.20 cm (1.7-3.4cm)  Asc Ao, d:          3.90 cm (2.1-3.4cm)      LV SYSTOLIC FUNCTION:  Normal Ranges:  EF-A4C View:    53 % (>=55%)  EF-A2C View:    40 %  EF-Biplane:     48 %  EF-Visual:      50 %  LV EF Reported: 50 %      LV DIASTOLIC FUNCTION:           Normal Ranges:  MV Peak E:             0.91 m/s  (0.7-1.2 m/s)  MV e'                  0.132 m/s (>8.0)  MV lateral e'          0.16 m/s  MV medial e'           0.11 m/s  E/e' Ratio:            6.87      (<8.0)      MITRAL VALVE:          Normal Ranges:  MV DT:        135 msec (150-240msec)      AORTIC VALVE:           Normal Ranges:  AoV Vmax:      1.09 m/s (<=1.7m/s)  AoV Peak P.7 mmHg (<20mmHg)  LVOT Max Doug:  0.98 m/s (<=1.1m/s)  LVOT VTI:      17.41 cm  LVOT Diameter: 2.34 cm  (1.8-2.4cm)  AoV Area,Vmax: 3.84 cm2 (2.5-4.5cm2)      RIGHT  VENTRICLE:  RV Basal 3.80 cm  RV Mid   1.90 cm  RV Major 7.8 cm  TAPSE:   18.0 mm  RV s'    0.13 m/s      TRICUSPID VALVE/RVSP:          Normal Ranges:  Peak TR Velocity:     2.19 m/s  RV Syst Pressure:     22 mmHg  (< 30mmHg)  IVC Diam:             2.17 cm      PULMONIC VALVE:         Normal Ranges:  PV Accel Time:  71 msec (>120ms)      AORTA:  Asc Ao Diam 3.88 cm      [id number] Tyrone Lin MD  Electronically signed on 3/23/2025 at 1:00:08 PM        ** Final **       CT angio chest w and wo IV contrast 03/22/2025    Narrative  STUDY:  CT Angiogram of the Chest; 03/22/2025 3:55 PM  INDICATION:  Assess for dissection with worsening pain in bilateral arms despite  normal troponin and EKG in setting of new atrial fibrillation with RVR  185.4.  COMPARISON:  None.  ACCESSION NUMBER(S):  [id number]  ORDERING CLINICIAN:  GENARO DIAS  TECHNIQUE:  CTA of the chest was performed without and with  intravenous contrast.  Images are reviewed and processed at a  workstation according to the CT angiogram protocol with 3-D and/or MIP  post processing imaging generated.  90 mL Omnipaque-350 was  administered intravenously.  1726 DICOM images received.  Automated mA/kV exposure control was utilized and patient examination  was performed in strict accordance with principles of ALARA.  FINDINGS:  Pulmonary arteries are adequately opacified without acute or chronic  filling defects.  The thoracic aorta is normal in course and caliber  without dissection or aneurysm.  Coronary artery calcifications are  present.  The heart is normal in size without pericardial effusion.  Thoracic  lymph nodes are not enlarged.  There is no pleural effusion, pleural thickening, or pneumothorax.  The airways are patent.  Lungs are clear without consolidation, interstitial disease, or  suspicious nodules.  Upper abdomen demonstrates no acute pathology.  There are no acute fractures.  No suspicious bony lesions.    Impression  1.  No evidence for thoracic  aortic dissection or aneurysmal  dilatation.  2.  No evidence for pulmonary embolus or acute lung parenchymal  process.  3.  Coronary artery calcifications.  Signed by MD Tyrone Leslie MD

## 2025-03-24 NOTE — NURSING NOTE
Patient had KAYLI completed, and then cath lab stated they were able to cardiovert patient at 325 joules. KAYLI showed no clot. Patient currently in NS Rhythm. He is alert and oriented cath lab reported.

## 2025-03-24 NOTE — PROGRESS NOTES
Physical Therapy                       Therapy Communication Note    Patient Name: Wyatt Malave  MRN: [id number]  Today's Date: 3/24/2025     Discipline: Physical Therapy    Missed Visit Reason: PT order received, chart reviewed. Pt off floor for procedure. Per RN has been getting up in room independently. Will reattempt PT evaluation as appropriate.     Missed Time: Attempt

## 2025-03-24 NOTE — NURSING NOTE
Patient exhibiting a lot of pain, and also diaphoresis. Informed physician that he also appears to be having a lot of anxiety at this time. Patient given pain medication as ordered. Lidocaine patch put on. Confirmed with physician patient was to have all medications this morning.

## 2025-03-25 ENCOUNTER — PATIENT OUTREACH (OUTPATIENT)
Dept: PRIMARY CARE | Facility: CLINIC | Age: 60
End: 2025-03-25
Payer: MEDICARE

## 2025-03-25 LAB
ATRIAL RATE: 64 BPM
P AXIS: 9 DEGREES
P OFFSET: 189 MS
P ONSET: 117 MS
PR INTERVAL: 194 MS
Q ONSET: 214 MS
QRS COUNT: 10 BEATS
QRS DURATION: 102 MS
QT INTERVAL: 398 MS
QTC CALCULATION(BAZETT): 410 MS
QTC FREDERICIA: 406 MS
R AXIS: -48 DEGREES
T AXIS: 33 DEGREES
T OFFSET: 413 MS
VENTRICULAR RATE: 64 BPM

## 2025-03-25 NOTE — PROGRESS NOTES
Discharge Facility: [facility]  Discharge Diagnosis: Atrial fibrillation with RVR   Admission Date: 3/22/25  Discharge Date: 3/24/25    PCP Appointment Date: TBD - msg to office  Specialist Appointment Date:   Tyrone Lin MD (Cardiology)  Norbert Hodges MD (Orthopaedic Surgery)  Hospital Encounter and Summary Linked: Yes  ED to Hosp-Admission (Discharged) with Meghan Cosby DO; Scottie Mcknight MD (03/22/2025)   See discharge assessment below for further details: N/A    Two attempts were made to reach patient within two business days after discharge. Voicemail left with contact information for patient to call back with any non-emergent questions or concerns.

## 2025-03-27 LAB
6MAM UR CFM-MCNC: <25 NG/ML
CARBOXYTHC UR-MCNC: 341 NG/ML
CODEINE UR CFM-MCNC: <50 NG/ML
HYDROCODONE CTO UR CFM-MCNC: <25 NG/ML
HYDROMORPHONE UR CFM-MCNC: <25 NG/ML
MORPHINE UR CFM-MCNC: <50 NG/ML
NORHYDROCODONE UR CFM-MCNC: <25 NG/ML
NOROXYCODONE UR CFM-MCNC: >1000 NG/ML
OXYCODONE UR CFM-MCNC: 1445 NG/ML
OXYMORPHONE UR CFM-MCNC: 1425 NG/ML

## 2025-04-03 ENCOUNTER — OFFICE VISIT (OUTPATIENT)
Dept: ORTHOPEDIC SURGERY | Facility: CLINIC | Age: 60
End: 2025-04-03
Payer: MEDICARE

## 2025-04-03 DIAGNOSIS — M25.512 LEFT SHOULDER PAIN, UNSPECIFIED CHRONICITY: ICD-10-CM

## 2025-04-03 PROCEDURE — 99203 OFFICE O/P NEW LOW 30 MIN: CPT | Performed by: ORTHOPAEDIC SURGERY

## 2025-04-03 PROCEDURE — 99213 OFFICE O/P EST LOW 20 MIN: CPT | Performed by: ORTHOPAEDIC SURGERY

## 2025-04-03 NOTE — PROGRESS NOTES
History of present: History left shoulder pain grinding and catching    Interestingly he thought he was having chest pain heart attack they found out his shoulder had arthritis but that he was in A-fib and now he is on high-dose Eliquis        Past medical history:    The patient's past medical history, family history, social history and review of systems were documented on the patient's medical intake form.  The medical intake form was reviewed and scanned into the electronic medical record for future use.  History is otherwise negative except as stated in the history of present illness.    Physical exam:    General: Alert and oriented to person place and time.  No acute distress and breathing comfortably, pleasant and cooperative with examination.    Head and neck exam: Head is normocephalic atraumatic.    Neck: Supple no visible swelling or deformity.    Cardiovascular: Good perfusion to affected extremities without signs or symptoms of chest pain.    Lungs no audible wheezing or labored breathing on examination.    Abdominal exam: Nondistended nontender    Extremities: The left shoulder was inspected and was found to have no obvious deformity.  There was tenderness to touch over the lateral edges of the shoulder over the rotator cuff insertion.  Active forward flexion 110 degrees, external rotation to 50 degrees, abduction to 45 degrees, and internal rotation to the level of L2.    At this time the shoulder is neurovascular tact and neurosensory intact.  Motor intact C5-T1.  There was no obvious neck pain or radiculopathy noted.  There was no gross instability or adhesive capsulitis symptoms.  There was no evidence of apprehension or apprehension suppression.    Strength was tested in 4 planes with weakness in the supraspinatus strength testing and external rotation position.  There was no strength deficit in internal rotation.  Impingement signs were positive both supine and standing for impingement test type I  and II.  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

## 2025-04-05 LAB
ATRIAL RATE: 174 BPM
Q ONSET: 216 MS
Q ONSET: 218 MS
QRS COUNT: 16 BEATS
QRS COUNT: 26 BEATS
QRS DURATION: 80 MS
QRS DURATION: 84 MS
QT INTERVAL: 292 MS
QT INTERVAL: 340 MS
QTC CALCULATION(BAZETT): 445 MS
QTC CALCULATION(BAZETT): 469 MS
QTC FREDERICIA: 400 MS
QTC FREDERICIA: 407 MS
R AXIS: -42 DEGREES
R AXIS: -66 DEGREES
T AXIS: 29 DEGREES
T AXIS: 63 DEGREES
T OFFSET: 364 MS
T OFFSET: 386 MS
VENTRICULAR RATE: 103 BPM
VENTRICULAR RATE: 155 BPM

## 2025-04-08 ENCOUNTER — PATIENT OUTREACH (OUTPATIENT)
Dept: PRIMARY CARE | Facility: CLINIC | Age: 60
End: 2025-04-08
Payer: MEDICARE

## 2025-04-08 NOTE — PROGRESS NOTES
Call regarding recent hospitalization.  At time of outreach call the patient feels as if their condition has (improved) since last visit. Pt reports he did not see his PCP as he had follow up with ortho and will be following up with his cardiologist as well. Reports his BP dropped too low 98/40s and so he is not taking the metoprolol until after he talks with his cardiologist next week. Denies questions/concerns at this time.

## 2025-04-11 ENCOUNTER — OFFICE VISIT (OUTPATIENT)
Dept: CARDIOLOGY | Facility: CLINIC | Age: 60
End: 2025-04-11
Payer: MEDICARE

## 2025-04-11 VITALS
BODY MASS INDEX: 31.83 KG/M2 | HEART RATE: 86 BPM | DIASTOLIC BLOOD PRESSURE: 74 MMHG | HEIGHT: 72 IN | SYSTOLIC BLOOD PRESSURE: 122 MMHG | OXYGEN SATURATION: 95 % | WEIGHT: 235 LBS

## 2025-04-11 DIAGNOSIS — I48.91 ATRIAL FIBRILLATION WITH RVR (MULTI): ICD-10-CM

## 2025-04-11 DIAGNOSIS — I25.10 CORONARY ARTERY CALCIFICATION SEEN ON CAT SCAN: ICD-10-CM

## 2025-04-11 PROCEDURE — 3078F DIAST BP <80 MM HG: CPT | Performed by: INTERNAL MEDICINE

## 2025-04-11 PROCEDURE — 93005 ELECTROCARDIOGRAM TRACING: CPT | Performed by: INTERNAL MEDICINE

## 2025-04-11 PROCEDURE — 3008F BODY MASS INDEX DOCD: CPT | Performed by: INTERNAL MEDICINE

## 2025-04-11 PROCEDURE — 99214 OFFICE O/P EST MOD 30 MIN: CPT | Performed by: INTERNAL MEDICINE

## 2025-04-11 PROCEDURE — 3074F SYST BP LT 130 MM HG: CPT | Performed by: INTERNAL MEDICINE

## 2025-04-11 RX ORDER — FLECAINIDE ACETATE 100 MG/1
300 TABLET ORAL ONCE AS NEEDED
Qty: 12 TABLET | Refills: 1 | Status: SHIPPED | OUTPATIENT
Start: 2025-04-11

## 2025-04-11 RX ORDER — LOSARTAN POTASSIUM AND HYDROCHLOROTHIAZIDE 12.5; 5 MG/1; MG/1
1 TABLET ORAL DAILY
Qty: 90 TABLET | Refills: 3 | Status: SHIPPED | OUTPATIENT
Start: 2025-04-11 | End: 2026-04-11

## 2025-04-11 RX ORDER — ATORVASTATIN CALCIUM 40 MG/1
40 TABLET, FILM COATED ORAL NIGHTLY
Qty: 90 TABLET | Refills: 3 | Status: SHIPPED | OUTPATIENT
Start: 2025-04-11 | End: 2026-04-11

## 2025-04-11 NOTE — PROGRESS NOTES
Name : Wyatt Malave   : 1965   MRN : [id number]   ENC Date : 2025      Assessment and Plan:  Paroxysmal atrial fibrillation: Patient remains in normal sinus rhythm.  Okay to discontinue Eliquis when he completes his prescription.  Continue aspirin indefinitely.  We discussed the pill in the pocket strategy.  Think he would be a good candidate for this since he is able to track his rhythm with his smart watch.  He has normal LV function and though he has some mild coronary calcification he has no signs of coronary artery disease.  Patient was agreeable with this plan.  If his A-fib recurs then we have additional options.  Hypertension: Patient's blood pressure well-controlled on losartan/HCT.  This is a reasonable strategy for now.  Coronary calcification: I asked patient to resume atorvastatin.  He was agreeable.  He thought this might be causing some of the side effects which were actually probably more related to metoprolol.  Erectile dysfunction: Worsened with beta-blocker.  Okay to continue PDE 5 inhibitors.  Disp: RTO in 6 months    HPI:  Patient returns today posthospitalization.  He is maintaining sinus rhythm as evidenced by his smart watch.  EKG shows normal sinus rhythm.  He stopped taking his metoprolol because of erectile dysfunction and low blood pressure.  He also switched from losartan back to losartan HCT which she had at home and this has been controlling his blood pressure nicely.  He also stopped atorvastatin thinking this was playing a role in his blood pressure but he really did not have any myalgias from it.    Problem list overview:   Patient Active Problem List   Diagnosis    HTN (hypertension)    Encounter to establish care with new doctor    BMI 34.0-34.9,adult    Class 1 obesity due to excess calories without serious comorbidity with body mass index (BMI) of 34.0 to 34.9 in adult       Meds:   Current Outpatient Medications on File Prior to Visit   Medication Sig Dispense  Refill    aspirin 81 mg chewable tablet Chew 1 tablet (81 mg) once daily. 30 tablet 0    sildenafil (Viagra) 100 mg tablet Take 1 tablet (100 mg) by mouth once daily as needed for erectile dysfunction.      tirzepatide, weight loss, 5 mg/0.5 mL solution Inject 5 mg under the skin every 7 days.      [DISCONTINUED] apixaban (Eliquis) 5 mg tablet Take 1 tablet (5 mg) by mouth 2 times a day. 60 tablet 0    [DISCONTINUED] losartan-hydrochlorothiazide (Hyzaar) 50-12.5 mg tablet Take 1 tablet by mouth once daily.      [DISCONTINUED] atorvastatin (Lipitor) 40 mg tablet Take 1 tablet (40 mg) by mouth once daily at bedtime. (Patient not taking: Reported on 4/11/2025) 30 tablet 0    [DISCONTINUED] lidocaine 4 % patch Place 1 patch over 12 hours on the skin once daily for 10 days. Remove & discard patch within 12 hours or as directed by MD. (Patient not taking: Reported on 4/11/2025) 10 patch 0    [DISCONTINUED] losartan potassium (COZAAR ORAL)  (Patient not taking: Reported on 4/11/2025)      [DISCONTINUED] metoprolol tartrate (Lopressor) 50 mg tablet Take 1 tablet by mouth 2 times a day. (Patient not taking: Reported on 4/11/2025) 60 tablet 0     No current facility-administered medications on file prior to visit.        VS:  /74 (BP Location: Right arm, Patient Position: Sitting)   Pulse 86   Ht 1.829 m (6')   Wt 107 kg (235 lb)   SpO2 95%   BMI 31.87 kg/m²     Vitals reviewed.   Neck:      Vascular: No JVD.   Pulmonary:      Breath sounds: Normal breath sounds.   Cardiovascular:      Normal rate. Regular rhythm.      Murmurs: There is no murmur.      No gallop.    Edema:     Peripheral edema absent.   Abdominal:      General: Abdomen is flat.      Palpations: Abdomen is soft.   Skin:     General: Skin is warm.         ECG: No results found for this or any previous visit.   ECHO: Results for orders placed during the hospital encounter of 03/22/25    Transesophageal Echo (KAYLI) With Possible  Cardioversion    Narrative  [facility] [place]  [id number] [address], [facility] [address]  Tel [phone] Fax [phone]    TRANSESOPHAGEAL ECHOCARDIOGRAM REPORT    Patient Name:       TATIANA MIRAMONTES          Reading Physician:    [id number] Schilling MD  Study Date:         3/24/2025            Ordering Provider:    [id number] ZULY SCHILLING  MRN/PID:            [id number]             Fellow:  Accession#:         [id number]         Nurse:                Julia Dean RN  Date of Birth/Age:  1965 / 59      Sonographer:          Shameka alva                                      RD  Gender Assigned at  M                    Additional Staff:  Birth:  Height:             182.00 cm            Admit Date:  Weight:             106.60 kg            Admission Status:     Inpatient -  Routine  BSA / BMI:          2.27 m2 / 32.18      Department Location:  [facility]  kg/m2  Blood Pressure: 139 /90 mmHg    Study Type:    TRANSESOPHAGEAL ECHO (KAYLI)  Diagnosis/ICD: Other persistent AFib-I48.19  Indication:    Afib  CPT Codes:     KAYLI Complete-10930    Patient History:  Pertinent History: A-Fib. previous echo 3/23/25.    Study Detail: The following Echo studies were performed: 2D, Doppler and color  flow.      PHYSICIAN INTERPRETATION:  KAYLI Details: Technically adequate omniplane transesophageal echocardiogram performed.  KAYLI Medication: The patient was sedated by Anesthesia; please refer to anesthesia flow sheet for medications used. Propofol was used to sedate the patient for this exam.  KAYLI Procedure: The probe was passed without difficulty. The following complication was encountered during the procedure: Patient tolerated the procedure well without any apparent complications.  Left Ventricle: The left ventricular systolic function is normal, with a visually estimated ejection fraction of 55-60%. There are no regional wall motion abnormalities. The left ventricular cavity size is normal. Left ventricular  diastolic filling was not assessed due to loni.  Left Atrium: The left atrial size is mildly dilated. There is no definite left atrial thrombus present. A bubble study using agitated saline was not performed. There is a normal sized left atrial appendage, there is faint spontaneous contrast noted in the left atrial appendage and there is no thrombus visualized in the left atrial appendage. There is no definite left atrial mass present.  Right Ventricle: The right ventricle is normal in size. There is normal right ventricular global systolic function.  Right Atrium: The right atrial size is normal.  Aortic Valve: The aortic valve is trileaflet. There is mild aortic valve regurgitation.  Mitral Valve: The mitral valve is mildly thickened. There is mild mitral valve regurgitation. 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  Tricuspid Valve: The tricuspid valve is structurally normal. There is mild to moderate tricuspid regurgitation. The right ventricular systolic pressure is unable to be estimated.  Pulmonic Valve: The pulmonic valve is structurally normal. There is no indication of pulmonic valve regurgitation.  Pericardium: No pericardial effusion noted.  Aorta: The aortic root is normal.      CONCLUSIONS:  1. The left ventricular systolic function is normal, with a visually estimated ejection fraction of 55-60%.  2. Mild to moderate tricuspid regurgitation.  3. Mild aortic valve regurgitation.  4. No left atrial mass.  5. No left atrial thrombus.    QUANTITATIVE DATA SUMMARY:    LV SYSTOLIC FUNCTION:  Normal Ranges:  EF-Visual:      58 %  LV EF Reported: 58 %      [id number] Tyrone Lin MD  Electronically signed on 3/24/2025 at 1:18:40 PM        ** Final **     CT Results:  CT angio chest w and wo IV contrast 03/22/2025    Narrative  STUDY:  CT Angiogram of the Chest; 03/22/2025 3:55 PM  INDICATION:  Assess for dissection with worsening pain in bilateral arms despite  normal troponin and EKG in setting of new atrial fibrillation with  RVR  185.4.  COMPARISON:  None.  ACCESSION NUMBER(S):  [id number]  ORDERING CLINICIAN:  GENARO DIAS  TECHNIQUE:  CTA of the chest was performed without and with  intravenous contrast.  Images are reviewed and processed at a  workstation according to the CT angiogram protocol with 3-D and/or MIP  post processing imaging generated.  90 mL Omnipaque-350 was  administered intravenously.  1726 DICOM images received.  Automated mA/kV exposure control was utilized and patient examination  was performed in strict accordance with principles of ALARA.  FINDINGS:  Pulmonary arteries are adequately opacified without acute or chronic  filling defects.  The thoracic aorta is normal in course and caliber  without dissection or aneurysm.  Coronary artery calcifications are  present.  The heart is normal in size without pericardial effusion.  Thoracic  lymph nodes are not enlarged.  There is no pleural effusion, pleural thickening, or pneumothorax.  The airways are patent.  Lungs are clear without consolidation, interstitial disease, or  suspicious nodules.  Upper abdomen demonstrates no acute pathology.  There are no acute fractures.  No suspicious bony lesions.    Impression  1.  No evidence for thoracic aortic dissection or aneurysmal  dilatation.  2.  No evidence for pulmonary embolus or acute lung parenchymal  process.  3.  Coronary artery calcifications.  Signed by MD Tyrone Leslie MD

## 2025-04-16 LAB
ATRIAL RATE: 84 BPM
P AXIS: 14 DEGREES
P OFFSET: 190 MS
P ONSET: 125 MS
PR INTERVAL: 176 MS
Q ONSET: 213 MS
QRS COUNT: 13 BEATS
QRS DURATION: 84 MS
QT INTERVAL: 364 MS
QTC CALCULATION(BAZETT): 430 MS
QTC FREDERICIA: 407 MS
R AXIS: -53 DEGREES
T AXIS: 29 DEGREES
T OFFSET: 395 MS
VENTRICULAR RATE: 84 BPM

## 2025-06-26 ENCOUNTER — APPOINTMENT (OUTPATIENT)
Dept: ORTHOPEDIC SURGERY | Facility: CLINIC | Age: 60
End: 2025-06-26
Payer: MEDICARE